# Patient Record
Sex: MALE | Race: OTHER | NOT HISPANIC OR LATINO
[De-identification: names, ages, dates, MRNs, and addresses within clinical notes are randomized per-mention and may not be internally consistent; named-entity substitution may affect disease eponyms.]

---

## 2019-07-16 PROBLEM — Z00.00 ENCOUNTER FOR PREVENTIVE HEALTH EXAMINATION: Status: ACTIVE | Noted: 2019-07-16

## 2019-07-17 ENCOUNTER — APPOINTMENT (OUTPATIENT)
Dept: CARDIOLOGY | Facility: CLINIC | Age: 84
End: 2019-07-17
Payer: MEDICARE

## 2019-07-17 ENCOUNTER — NON-APPOINTMENT (OUTPATIENT)
Age: 84
End: 2019-07-17

## 2019-07-17 ENCOUNTER — EMERGENCY (EMERGENCY)
Facility: HOSPITAL | Age: 84
LOS: 1 days | End: 2019-07-17
Admitting: EMERGENCY MEDICINE
Payer: MEDICARE

## 2019-07-17 VITALS
WEIGHT: 201 LBS | BODY MASS INDEX: 31.55 KG/M2 | HEART RATE: 69 BPM | DIASTOLIC BLOOD PRESSURE: 70 MMHG | HEIGHT: 67 IN | SYSTOLIC BLOOD PRESSURE: 110 MMHG | OXYGEN SATURATION: 95 %

## 2019-07-17 DIAGNOSIS — Z82.5 FAMILY HISTORY OF ASTHMA AND OTHER CHRONIC LOWER RESPIRATORY DISEASES: ICD-10-CM

## 2019-07-17 DIAGNOSIS — Z78.9 OTHER SPECIFIED HEALTH STATUS: ICD-10-CM

## 2019-07-17 DIAGNOSIS — Z82.49 FAMILY HISTORY OF ISCHEMIC HEART DISEASE AND OTHER DISEASES OF THE CIRCULATORY SYSTEM: ICD-10-CM

## 2019-07-17 PROCEDURE — 74177 CT ABD & PELVIS W/CONTRAST: CPT | Mod: 26

## 2019-07-17 PROCEDURE — 99203 OFFICE O/P NEW LOW 30 MIN: CPT

## 2019-07-17 PROCEDURE — 71275 CT ANGIOGRAPHY CHEST: CPT | Mod: 26

## 2019-07-17 PROCEDURE — 99285 EMERGENCY DEPT VISIT HI MDM: CPT

## 2019-07-17 PROCEDURE — 71045 X-RAY EXAM CHEST 1 VIEW: CPT | Mod: 26

## 2019-07-17 NOTE — PHYSICAL EXAM
[General Appearance - Well Developed] : well developed [Normal Appearance] : normal appearance [Well Groomed] : well groomed [General Appearance - Well Nourished] : well nourished [No Deformities] : no deformities [General Appearance - In No Acute Distress] : no acute distress [Normal Conjunctiva] : the conjunctiva exhibited no abnormalities [Eyelids - No Xanthelasma] : the eyelids demonstrated no xanthelasmas [Normal Oral Mucosa] : normal oral mucosa [No Oral Pallor] : no oral pallor [No Oral Cyanosis] : no oral cyanosis [Normal Jugular Venous A Waves Present] : normal jugular venous A waves present [Normal Jugular Venous V Waves Present] : normal jugular venous V waves present [No Jugular Venous Garcia A Waves] : no jugular venous garcia A waves [Heart Rate And Rhythm] : heart rate and rhythm were normal [Murmurs] : no murmurs present [FreeTextEntry1] : 2/6 MARYLIN LSB [Respiration, Rhythm And Depth] : normal respiratory rhythm and effort [Exaggerated Use Of Accessory Muscles For Inspiration] : no accessory muscle use [Auscultation Breath Sounds / Voice Sounds] : lungs were clear to auscultation bilaterally [Abdomen Soft] : soft [Abdomen Tenderness] : non-tender [Abdomen Mass (___ Cm)] : no abdominal mass palpated [Abnormal Walk] : normal gait [Gait - Sufficient For Exercise Testing] : the gait was sufficient for exercise testing [Nail Clubbing] : no clubbing of the fingernails [Cyanosis, Localized] : no localized cyanosis [Petechial Hemorrhages (___cm)] : no petechial hemorrhages [Skin Color & Pigmentation] : normal skin color and pigmentation [] : no rash [No Venous Stasis] : no venous stasis [Skin Lesions] : no skin lesions [No Skin Ulcers] : no skin ulcer [No Xanthoma] : no  xanthoma was observed [Oriented To Time, Place, And Person] : oriented to person, place, and time [Affect] : the affect was normal [Mood] : the mood was normal [No Anxiety] : not feeling anxious

## 2019-07-17 NOTE — REASON FOR VISIT
[Consultation] : a consultation regarding [FreeTextEntry2] : new symptomatic BENJY conversion to sinus rhythm with rate control, recurrent chest and abdominal pain [FreeTextEntry1] : Jeanmarie is a 84-year-old male with history of hypertension, asthma.\par \par Patient had office evaluation PCP 7/16/19 recurrent chest pain abdominal pain found to be in new-onset rapid atrial fibrillation.  Patient started on Bystolic and Eliquis.  EKG 7/16/19 rapid AF 136bpm, RBBB, NSST.  EKG 7/17/19 sinus rhythm 66 bpm RBBB, LAFB, bifiaicular block\par \par Patient reports over the past 2 weeks recurrent episodes of chest pain "pressure" moderate intensity with radiation to the back associated with emotional stress.  Last chest pain occurred 2 days ago.  Patient reports recurrent abdominal pain with radiation to the back when laying down.  No nausea, vomiting, diarrhea, bloody stools. Cardiovascular review of symptoms is negative for exertional  dyspnea, palpitations, dizziness or syncope.  No PND or orthopnea leg edema.  No bleeding or black stool.\par \par No history of CAD, MI, CHF, TIA, CVA, diabetes, PVD, DVT, PE, arrhythmia, Afib\par \par Patient is working as a  lifting heavy cuts of meat.  Patient walked for 10 minutes with exertional chest pain.  Discussed limiting lifting to less than 20lbs.

## 2019-07-17 NOTE — DISCUSSION/SUMMARY
[FreeTextEntry1] : Jeanmarie is 84-year-old male with medical history detailed above and active medical issues including:\par \par - Recurrent chest pain, abnormal baseline EKG, multiple CAD risk factors. Risks and options of cardiac catheterization have been discussed, including the risk of bleeding stroke heart attack.  Patient wishes to proceed and will be scheduled for catheterization within one week.  Persistent chest pain patient will call 911 and go to the emergency room. Patient will discontinue Eliquis pending cardiac catheterization. \par \par - Abdominal pain with radiation to the back.  Patient will have contrast CT abdomen pelvis without abdominal aortic aneurysm.  Patient referred to ER [facility] for labs and CAT scan. \par \par - New-onset symptomatic BENJY, conversion to sinus rhythm with rate control on Bystolic and Eliquis\par \par - Hypertension at BP goal less than 130/80\par \par - History of asthma\par \par Patient educated on lifestyle and diet modification with low sodium low fat diet and avoidance of excessive alcohol. Patient is aware to call with any symptoms or concerns.  Recommended increased oral hydration with electrolyte suppliment drinks, avoid caffeine and alcohol intake.\par \par Patient will be seen in cardiology followup after cardiac catheterization.\par \par Followup with Dr Gurpreet Paulino for primary care. \par

## 2019-07-18 PROCEDURE — 93010 ELECTROCARDIOGRAM REPORT: CPT

## 2019-07-22 PROCEDURE — 93224 XTRNL ECG REC UP TO 48 HRS: CPT

## 2019-07-24 ENCOUNTER — OUTPATIENT (OUTPATIENT)
Dept: OUTPATIENT SERVICES | Facility: HOSPITAL | Age: 84
LOS: 1 days | End: 2019-07-24
Payer: MEDICARE

## 2019-07-24 PROCEDURE — 93010 ELECTROCARDIOGRAM REPORT: CPT

## 2019-07-24 PROCEDURE — 93458 L HRT ARTERY/VENTRICLE ANGIO: CPT | Mod: 26

## 2019-07-25 ENCOUNTER — APPOINTMENT (OUTPATIENT)
Dept: CARDIOLOGY | Facility: CLINIC | Age: 84
End: 2019-07-25
Payer: MEDICARE

## 2019-07-25 ENCOUNTER — NON-APPOINTMENT (OUTPATIENT)
Age: 84
End: 2019-07-25

## 2019-07-25 VITALS
SYSTOLIC BLOOD PRESSURE: 126 MMHG | HEIGHT: 67 IN | OXYGEN SATURATION: 93 % | HEART RATE: 144 BPM | DIASTOLIC BLOOD PRESSURE: 90 MMHG | BODY MASS INDEX: 31.39 KG/M2 | WEIGHT: 200 LBS

## 2019-07-25 PROCEDURE — 99215 OFFICE O/P EST HI 40 MIN: CPT

## 2019-07-25 PROCEDURE — 93306 TTE W/DOPPLER COMPLETE: CPT

## 2019-07-25 PROCEDURE — 93000 ELECTROCARDIOGRAM COMPLETE: CPT

## 2019-07-25 RX ORDER — NEBIVOLOL HYDROCHLORIDE 5 MG/1
5 TABLET ORAL DAILY
Refills: 0 | Status: DISCONTINUED | COMMUNITY
End: 2019-07-25

## 2019-07-25 NOTE — PHYSICAL EXAM
[General Appearance - Well Developed] : well developed [Normal Appearance] : normal appearance [Well Groomed] : well groomed [General Appearance - Well Nourished] : well nourished [No Deformities] : no deformities [General Appearance - In No Acute Distress] : no acute distress [Normal Conjunctiva] : the conjunctiva exhibited no abnormalities [Eyelids - No Xanthelasma] : the eyelids demonstrated no xanthelasmas [Normal Oral Mucosa] : normal oral mucosa [No Oral Pallor] : no oral pallor [No Oral Cyanosis] : no oral cyanosis [Normal Jugular Venous A Waves Present] : normal jugular venous A waves present [Normal Jugular Venous V Waves Present] : normal jugular venous V waves present [No Jugular Venous Garcia A Waves] : no jugular venous garcia A waves [Respiration, Rhythm And Depth] : normal respiratory rhythm and effort [Exaggerated Use Of Accessory Muscles For Inspiration] : no accessory muscle use [Auscultation Breath Sounds / Voice Sounds] : lungs were clear to auscultation bilaterally [Heart Rate And Rhythm] : heart rate and rhythm were normal [Murmurs] : no murmurs present [Abdomen Soft] : soft [Abdomen Tenderness] : non-tender [Abdomen Mass (___ Cm)] : no abdominal mass palpated [Abnormal Walk] : normal gait [Gait - Sufficient For Exercise Testing] : the gait was sufficient for exercise testing [Nail Clubbing] : no clubbing of the fingernails [Cyanosis, Localized] : no localized cyanosis [Petechial Hemorrhages (___cm)] : no petechial hemorrhages [Skin Color & Pigmentation] : normal skin color and pigmentation [] : no rash [No Venous Stasis] : no venous stasis [Skin Lesions] : no skin lesions [No Skin Ulcers] : no skin ulcer [No Xanthoma] : no  xanthoma was observed [Oriented To Time, Place, And Person] : oriented to person, place, and time [Affect] : the affect was normal [Mood] : the mood was normal [No Anxiety] : not feeling anxious [FreeTextEntry1] : 2/6 MARYLIN LSB

## 2019-07-25 NOTE — DISCUSSION/SUMMARY
[FreeTextEntry1] : Jeanmarie is 84-year-old male with medical history detailed above and active medical issues including:\par \par - Patient had rapid AF in office today given intravenous Lopressor 5 mg x1 and Lopressor 25 mg orally with improvement in AF rate below 100 bpm, stable blood pressure 125/70\par \par - Echocardiogram 7/25/19, LVEF 60%, severe MR eccentric jet with flail posterior mitral valve leaflet.  Case discussed with cardiothoracic surgeon Dr Fito Elliott.  Patient will have office consult tomorrow. \par \par - Patient had cardiac catheterization 7/24/19 for recurrent chest pain, nonobstructive coronaries, normal LVEF, moderate severe MR.  Prior to cardiac catheterization symptomatic rapid atrial fibrillation started on Eliquis and Toprol.\par \par - Hypertension at BP goal less than 130/80\par \par - History of asthma\par \par Patient educated on lifestyle and diet modification with low sodium low fat diet and avoidance of excessive alcohol. Patient is aware to call with any symptoms or concerns.  Recommended increased oral hydration with electrolyte suppliment drinks, avoid caffeine and alcohol intake.\par \par Patient will be seen in cardiology followup after mitral valve surgery. \par \par Followup with Dr Gurpreet Paulino for primary care. \par \par Additional office time today to monitor rhythm, HR and BP, insert intravenous, administer IV lopressor, oral lopressor, call cardiothorasic surgery to arrange office evaluation with CT surgeon Dr Fito Elliott.  \par

## 2019-07-25 NOTE — REASON FOR VISIT
[Consultation] : a consultation regarding [FreeTextEntry2] : nonobstructive cath 7/24/19, severe eccentric MR, flair posterior MVL tip, normal LVEF, symptomatic BENJY x2 started on Eliquis and Toprol   [FreeTextEntry1] : Jeanmarie is a 84-year-old male with history of hypertension, asthma. Patient seen in the office today with his daughter in law who is visiting from [place], [place]. \par \par Patient had rapid AF in office today given intravenous Lopressor 5 mg x1 and Lopressor 25 mg orally with improvement in AF rate below 100 bpm, stable blood pressure 125/70\par \par Echocardiogram 7/25/19, LVEF 60%, severe MR eccentric jet with flail posterior mitral valve leaflet.  Case discussed with cardiothoracic surgeon Dr Fito Elliott.  Patient will have office consult tomorrow. \par \par Patient had cardiac catheterization 7/24/19 for recurrent chest pain, nonobstructive coronaries, normal LVEF, moderate severe MR.  Prior to cardiac catheterization symptomatic rapid atrial fibrillation started on Eliquis and Toprol.\par \par Patient had office evaluation PCP 7/16/19 recurrent chest pain abdominal pain found to be in new-onset rapid atrial fibrillation.  Patient started on Bystolic and Eliquis.  EKG 7/16/19 rapid AF 136bpm, RBBB, NSST.  EKG 7/17/19 sinus rhythm 66 bpm RBBB, LAFB, bifiaicular block\par \par \par Patient had recurrent atypical chest pain and abdominal pain symptoms.  Contrast CT abdomen negative for aneurysm. Cardiovascular review of symptoms is negative for exertional chest pain, dyspnea, palpitations, dizziness or syncope.  No PND or orthopnea leg edema.  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  Discussed limiting lifting to less than 20lbs.

## 2019-07-26 ENCOUNTER — APPOINTMENT (OUTPATIENT)
Dept: CARDIOTHORACIC SURGERY | Facility: CLINIC | Age: 84
End: 2019-07-26
Payer: MEDICARE

## 2019-07-26 VITALS
DIASTOLIC BLOOD PRESSURE: 90 MMHG | RESPIRATION RATE: 16 BRPM | HEART RATE: 133 BPM | HEIGHT: 67 IN | SYSTOLIC BLOOD PRESSURE: 129 MMHG | WEIGHT: 200 LBS | BODY MASS INDEX: 31.39 KG/M2 | OXYGEN SATURATION: 98 %

## 2019-07-26 PROCEDURE — 99205 OFFICE O/P NEW HI 60 MIN: CPT

## 2019-07-30 ENCOUNTER — INPATIENT (INPATIENT)
Facility: HOSPITAL | Age: 84
LOS: 1 days | Discharge: HOME | End: 2019-08-01
Attending: INTERNAL MEDICINE | Admitting: FAMILY MEDICINE
Payer: MEDICARE

## 2019-07-30 ENCOUNTER — OUTPATIENT (OUTPATIENT)
Dept: OUTPATIENT SERVICES | Facility: HOSPITAL | Age: 84
LOS: 1 days | End: 2019-07-30

## 2019-07-30 PROCEDURE — 99223 1ST HOSP IP/OBS HIGH 75: CPT

## 2019-07-30 PROCEDURE — 99285 EMERGENCY DEPT VISIT HI MDM: CPT | Mod: GC

## 2019-07-30 PROCEDURE — 71045 X-RAY EXAM CHEST 1 VIEW: CPT | Mod: 26

## 2019-07-30 PROCEDURE — 93224 XTRNL ECG REC UP TO 48 HRS: CPT

## 2019-07-31 ENCOUNTER — OUTPATIENT (OUTPATIENT)
Dept: OUTPATIENT SERVICES | Facility: HOSPITAL | Age: 84
LOS: 1 days | End: 2019-07-31

## 2019-07-31 PROCEDURE — 93312 ECHO TRANSESOPHAGEAL: CPT | Mod: 26

## 2019-07-31 PROCEDURE — 92960 CARDIOVERSION ELECTRIC EXT: CPT

## 2019-07-31 PROCEDURE — 93010 ELECTROCARDIOGRAM REPORT: CPT | Mod: 76

## 2019-07-31 PROCEDURE — 99233 SBSQ HOSP IP/OBS HIGH 50: CPT | Mod: 25

## 2019-07-31 PROCEDURE — 93320 DOPPLER ECHO COMPLETE: CPT | Mod: 26

## 2019-08-01 ENCOUNTER — OUTPATIENT (OUTPATIENT)
Dept: OUTPATIENT SERVICES | Facility: HOSPITAL | Age: 84
LOS: 1 days | End: 2019-08-01

## 2019-08-01 ENCOUNTER — APPOINTMENT (OUTPATIENT)
Dept: CARDIOLOGY | Facility: CLINIC | Age: 84
End: 2019-08-01

## 2019-08-01 PROCEDURE — 99233 SBSQ HOSP IP/OBS HIGH 50: CPT

## 2019-08-06 ENCOUNTER — APPOINTMENT (OUTPATIENT)
Dept: CARDIOTHORACIC SURGERY | Facility: CLINIC | Age: 84
End: 2019-08-06
Payer: MEDICARE

## 2019-08-06 VITALS
BODY MASS INDEX: 31.39 KG/M2 | RESPIRATION RATE: 16 BRPM | HEART RATE: 108 BPM | HEIGHT: 67 IN | WEIGHT: 200 LBS | DIASTOLIC BLOOD PRESSURE: 86 MMHG | SYSTOLIC BLOOD PRESSURE: 128 MMHG | OXYGEN SATURATION: 97 %

## 2019-08-06 PROCEDURE — 99213 OFFICE O/P EST LOW 20 MIN: CPT

## 2019-08-06 RX ORDER — VIT A/VIT C/VIT E/ZINC/COPPER 4296-226
CAPSULE ORAL
Refills: 0 | Status: COMPLETED | COMMUNITY
End: 2019-08-06

## 2019-08-06 NOTE — PHYSICAL EXAM
[Sclera] : the sclera and conjunctiva were normal [Neck Appearance] : the appearance of the neck was normal [] : no respiratory distress [Exaggerated Use Of Accessory Muscles For Inspiration] : no accessory muscle use [Apical Impulse] : the apical impulse was normal [Heart Sounds] : normal S1 and S2 [Examination Of The Chest] : the chest was normal in appearance [Arterial Pulses Carotid] : carotid pulses were normal with no bruits [Arterial Pulses Femoral] : femoral pulses were normal without bruits [Edema] : there was no peripheral edema [Bowel Sounds] : normal bowel sounds [Abdomen Soft] : soft [Cervical Lymph Nodes Enlarged Posterior Bilaterally] : posterior cervical [Supraclavicular Lymph Nodes Enlarged Bilaterally] : supraclavicular [No CVA Tenderness] : no ~M costovertebral angle tenderness [FreeTextEntry1] : unsteady gait. frail hernesto [Skin Color & Pigmentation] : normal skin color and pigmentation [Skin Turgor] : normal skin turgor [Cranial Nerves] : cranial nerves 2-12 were intact [Impaired Insight] : insight and judgment were intact [Oriented To Time, Place, And Person] : oriented to person, place, and time

## 2019-08-06 NOTE — HISTORY OF PRESENT ILLNESS
[FreeTextEntry1] : This is a 84 year old with a history of recurrent chest pain and abdominal pain found to have new-on set rapid atrial fibrillation patient was started on bystolic and eliquis. Bystolic now changed to metoprolol to control his heart rate. Cardiac cath from 7/24/19 showed moderate to severe mitral valve regurgitation. Echocardiogram on 7/25/19, LVEF 60%, severe MR eccentric jet with flail posterior mitral valve leaflet.\par \par Mr Joiner had been hospitalized at [facility] on 7/31/19 for reports of "very high heart rate of 135-172" and a cardiac ablation with PA was performed by Dr Pineda.\par \par PA at that time demonstrated severe prolapse of posterior mitral leaflet with slight flail of P2 segment, severe torrential mitral regurgitation and moderate tricuspid regurg.

## 2019-08-06 NOTE — ASSESSMENT
[FreeTextEntry1] : Mr. Joiner is an 84 year old male with frailty and severe mitral valve regurgitation. Given his age and frailty he is at higher irks for open surgery and does not want to proceed with open surgery. He is a candidate for mitral clipping procedure.  I explained the risks, benefits, and alternatives of surgery to the patient and family. They understand and agree to proceed. I thank you for the opportunity to participate in the care of your patients. Please do not hesitate to contact me should you have any questions.\par

## 2019-08-06 NOTE — REVIEW OF SYSTEMS
[Feeling Tired] : feeling tired [Palpitations] : palpitations [Lower Ext Edema] : lower extremity edema [Shortness Of Breath] : shortness of breath [SOB on Exertion] : shortness of breath during exertion [Negative] : Heme/Lymph

## 2019-08-06 NOTE — CONSULT LETTER
[Dear  ___] : Dear  [unfilled], [Consult Closing:] : Thank you very much for allowing me to participate in the care of this patient.  If you have any questions, please do not hesitate to contact me. [Consult Letter:] : I had the pleasure of evaluating your patient, [unfilled]. [Sincerely,] : Sincerely, [FreeTextEntry2] : Patrick Valentino, MD [FreeTextEntry3] : Fito Burgess MD\par  of Cardiothoracic Surgery\par [facility]\par [address] [place] [address] \[place] [address]\par [phone]\par

## 2019-08-08 ENCOUNTER — NON-APPOINTMENT (OUTPATIENT)
Age: 84
End: 2019-08-08

## 2019-08-08 ENCOUNTER — APPOINTMENT (OUTPATIENT)
Dept: CARDIOLOGY | Facility: CLINIC | Age: 84
End: 2019-08-08
Payer: MEDICARE

## 2019-08-08 VITALS
HEART RATE: 107 BPM | DIASTOLIC BLOOD PRESSURE: 60 MMHG | OXYGEN SATURATION: 97 % | BODY MASS INDEX: 31.55 KG/M2 | HEIGHT: 67 IN | SYSTOLIC BLOOD PRESSURE: 112 MMHG | WEIGHT: 201 LBS

## 2019-08-08 PROCEDURE — 99214 OFFICE O/P EST MOD 30 MIN: CPT

## 2019-08-08 PROCEDURE — 93000 ELECTROCARDIOGRAM COMPLETE: CPT

## 2019-08-08 RX ORDER — METOPROLOL SUCCINATE 50 MG/1
50 TABLET, EXTENDED RELEASE ORAL DAILY
Qty: 90 | Refills: 1 | Status: DISCONTINUED | COMMUNITY
Start: 1900-01-01 | End: 2019-08-08

## 2019-08-08 NOTE — PHYSICAL EXAM
[General Appearance - Well Developed] : well developed [Normal Appearance] : normal appearance [Well Groomed] : well groomed [General Appearance - Well Nourished] : well nourished [General Appearance - In No Acute Distress] : no acute distress [No Deformities] : no deformities [Normal Conjunctiva] : the conjunctiva exhibited no abnormalities [Normal Oral Mucosa] : normal oral mucosa [Eyelids - No Xanthelasma] : the eyelids demonstrated no xanthelasmas [No Oral Pallor] : no oral pallor [No Oral Cyanosis] : no oral cyanosis [Normal Jugular Venous V Waves Present] : normal jugular venous V waves present [Normal Jugular Venous A Waves Present] : normal jugular venous A waves present [No Jugular Venous Garcia A Waves] : no jugular venous garcia A waves [Respiration, Rhythm And Depth] : normal respiratory rhythm and effort [Exaggerated Use Of Accessory Muscles For Inspiration] : no accessory muscle use [Auscultation Breath Sounds / Voice Sounds] : lungs were clear to auscultation bilaterally [Murmurs] : no murmurs present [Heart Rate And Rhythm] : heart rate and rhythm were normal [Abdomen Soft] : soft [Abdomen Tenderness] : non-tender [Abnormal Walk] : normal gait [Abdomen Mass (___ Cm)] : no abdominal mass palpated [Gait - Sufficient For Exercise Testing] : the gait was sufficient for exercise testing [Nail Clubbing] : no clubbing of the fingernails [Cyanosis, Localized] : no localized cyanosis [Petechial Hemorrhages (___cm)] : no petechial hemorrhages [] : no rash [Skin Color & Pigmentation] : normal skin color and pigmentation [No Venous Stasis] : no venous stasis [No Skin Ulcers] : no skin ulcer [Skin Lesions] : no skin lesions [No Xanthoma] : no  xanthoma was observed [Affect] : the affect was normal [Oriented To Time, Place, And Person] : oriented to person, place, and time [Mood] : the mood was normal [No Anxiety] : not feeling anxious [FreeTextEntry1] : irreg 3/6 MARYLIN MR

## 2019-08-08 NOTE — DISCUSSION/SUMMARY
[FreeTextEntry1] : Jeanmarie is 84-year-old male with medical history detailed above and active medical issues including:\par \par - Moderate accelerated rate AF, patient will take extra dose amiodarone 200 today and continue  Amiodarone 200mg daily dose. Patient states home heart rates in the 80s with stable blood pressure.  \par \par - Echocardiogram 7/25/19, LVEF 60%, severe MR eccentric jet with flail posterior mitral valve leaflet.  Patient evaluated by CT surgeon Dr Elliott with plan for mitral valve clip September 2019.  Patient is high-risk for open valve repair surgery with advanced age and frail condition.  \par \par - Patient had cardiac catheterization 7/24/19 for recurrent chest pain, nonobstructive coronaries, normal LVEF, moderate severe MR.  Prior to cardiac catheterization symptomatic rapid atrial fibrillation started on Eliquis and Toprol.\par \par - Hypertension at BP goal less than 130/80\par \par - History of asthma\par \par Patient educated on lifestyle and diet modification with low sodium low fat diet and avoidance of excessive alcohol. Patient is aware to call with any symptoms or concerns.  Recommended increased oral hydration with electrolyte suppliment drinks, avoid caffeine and alcohol intake.\par \par Patient will be seen in cardiology followup 2 weeks.  Patient will monitor home heart rate and blood pressures daily and call with average HR/BP.  \par \par Followup with Dr Gurpreet Paulino for primary care. \par

## 2019-08-08 NOTE — REASON FOR VISIT
[Consultation] : a consultation regarding [FreeTextEntry2] : nonobstructive cath 7/24/19, severe eccentric MR, flair posterior MVL tip, normal LVEF, symptomatic BENJY x2 started on Eliquis and Toprol   [FreeTextEntry1] : Jeanmarie is a 84-year-old male with history of hypertension, asthma. 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  Patient is high-risk for open valve repair surgery with advanced age and frail condition. \par \par Patient has dyspnea with moderate exertion.  Patient has mild dependent leg edema. Cardiovascular review of symptoms is negative for exertional chest pain, palpitations, dizziness or syncope.  No PND or orthopnea.  No bleeding or black stool.\par \par Patient had rapid AF in office 7/26/19 given intravenous Lopressor 5 mg x1 and Lopressor 25 mg orally with improvement in AF rate below 100 bpm, stable blood pressure 125/70\par \par Echocardiogram 7/25/19, LVEF 60%, severe MR eccentric jet with flail posterior mitral valve leaflet.  Case discussed with cardiothoracic surgeon Dr Fito Elliott.  Patient will have office consult tomorrow. \par \par Patient had cardiac catheterization 7/24/19 for recurrent chest pain, nonobstructive coronaries, normal LVEF, moderate severe MR.  Prior to cardiac catheterization symptomatic rapid atrial fibrillation started on Eliquis and Toprol.\par \par Patient had office evaluation PCP 7/16/19 recurrent chest pain abdominal pain found to be in new-onset rapid atrial fibrillation.  Patient started on Bystolic and Eliquis.  EKG 7/16/19 rapid AF 136bpm, RBBB, NSST.  EKG 7/17/19 sinus rhythm 66 bpm RBBB, LAFB, bifiaicular block\par \par Patient had recurrent atypical chest pain and abdominal pain symptoms.  Contrast CT abdomen negative for aneurysm. Cardiovascular review of symptoms is negative for exertional chest pain, dyspnea, palpitations, dizziness or syncope.  No PND or orthopnea leg edema.  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  Discussed limiting lifting to less than 20lbs.

## 2019-08-09 NOTE — HISTORY OF PRESENT ILLNESS
[FreeTextEntry1] : This is a 84 year old with a history of recurrent chest pain and abdominal pain found to have new-on set rapid atrial fibrillation patient was started on bystolic and eliquis. Bystolic now changed to metoprolol to control his heart rate. Cardiac cath from 7/24/19 showed moderate to severe mitral valve regurgitation. Echocardiogram on 7/25/19, LVEF 60%, severe MR eccentric jet with flail posterior mitral valve leaflet .Presents today without complaints of chest pain or palpitations.

## 2019-08-09 NOTE — REVIEW OF SYSTEMS
[Heart Rate Is Fast] : fast heart rate [Chest Pain] : chest pain [Palpitations] : palpitations [SOB on Exertion] : shortness of breath during exertion [Easy Bruising] : a tendency for easy bruising [Negative] : Endocrine [Lower Ext Edema] : no extremity edema [Anxiety] : no anxiety [Depression] : no depression

## 2019-08-09 NOTE — PHYSICAL EXAM
[General Appearance - Alert] : alert [General Appearance - Well Nourished] : well nourished [General Appearance - Well Developed] : well developed [Sclera] : the sclera and conjunctiva were normal [Outer Ear] : the ears and nose were normal in appearance [Neck Appearance] : the appearance of the neck was normal [Exaggerated Use Of Accessory Muscles For Inspiration] : no accessory muscle use [Apical Impulse] : the apical impulse was normal [Heart Sounds] : normal S1 and S2 [FreeTextEntry1] : III/VI systolic murmru at th eapex [Examination Of The Chest] : the chest was normal in appearance [Arterial Pulses Carotid] : carotid pulses were normal with no bruits [Abdominal Aorta] : the abdominal aorta was normal [Arterial Pulses Femoral] : femoral pulses were normal without bruits [Bowel Sounds] : normal bowel sounds [Abdomen Soft] : soft [No CVA Tenderness] : no ~M costovertebral angle tenderness [Cervical Lymph Nodes Enlarged Posterior Bilaterally] : posterior cervical [Abnormal Walk] : normal gait [Nail Clubbing] : no clubbing  or cyanosis of the fingernails [Skin Color & Pigmentation] : normal skin color and pigmentation [] : no rash [Cranial Nerves] : cranial nerves 2-12 were intact [Sensation] : the sensory exam was normal to light touch and pinprick [Oriented To Time, Place, And Person] : oriented to person, place, and time [Affect] : the affect was normal

## 2019-08-09 NOTE — CONSULT LETTER
[Dear  ___] : Dear  [unfilled], [Consult Letter:] : I had the pleasure of evaluating your patient, [unfilled]. [Please see my note below.] : Please see my note below. [Consult Closing:] : Thank you very much for allowing me to participate in the care of this patient.  If you have any questions, please do not hesitate to contact me. [Sincerely,] : Sincerely, [FreeTextEntry2] : Dr. Patrick Valentino [FreeTextEntry3] : Fito Burgess MD\par \par Cardiovascular and Thoracic Surgery\par Associate  Professor\par [facility] [facility] [employer]\par [facility]\par [address] [place] [address]\par [place] [address]\par \par \par

## 2019-08-09 NOTE — ASSESSMENT
[FreeTextEntry1] : Mr. Joiner is an 84 year old with severe symptomatic mitral regurgitation who needs a PA to assess his valve and consider him for possible clip procedure. \par \par  I explained the risks, benefits, and alternatives of surgery to the patient and family. They understand and agree to proceed with testing.

## 2019-08-15 ENCOUNTER — APPOINTMENT (OUTPATIENT)
Dept: CARDIOLOGY | Facility: CLINIC | Age: 84
End: 2019-08-15
Payer: MEDICARE

## 2019-08-15 VITALS
HEART RATE: 62 BPM | BODY MASS INDEX: 32.02 KG/M2 | DIASTOLIC BLOOD PRESSURE: 64 MMHG | OXYGEN SATURATION: 95 % | WEIGHT: 204 LBS | HEIGHT: 67 IN | SYSTOLIC BLOOD PRESSURE: 100 MMHG

## 2019-08-15 PROCEDURE — 99214 OFFICE O/P EST MOD 30 MIN: CPT

## 2019-08-15 NOTE — PHYSICAL EXAM
[General Appearance - Well Developed] : well developed [Normal Appearance] : normal appearance [Well Groomed] : well groomed [General Appearance - Well Nourished] : well nourished [No Deformities] : no deformities [General Appearance - In No Acute Distress] : no acute distress [Normal Conjunctiva] : the conjunctiva exhibited no abnormalities [Eyelids - No Xanthelasma] : the eyelids demonstrated no xanthelasmas [Normal Oral Mucosa] : normal oral mucosa [No Oral Pallor] : no oral pallor [No Oral Cyanosis] : no oral cyanosis [Normal Jugular Venous A Waves Present] : normal jugular venous A waves present [Normal Jugular Venous V Waves Present] : normal jugular venous V waves present [No Jugular Venous Garcia A Waves] : no jugular venous garcia A waves [Respiration, Rhythm And Depth] : normal respiratory rhythm and effort [Exaggerated Use Of Accessory Muscles For Inspiration] : no accessory muscle use [Auscultation Breath Sounds / Voice Sounds] : lungs were clear to auscultation bilaterally [Heart Rate And Rhythm] : heart rate and rhythm were normal [Murmurs] : no murmurs present [Abdomen Soft] : soft [Abdomen Tenderness] : non-tender [Abdomen Mass (___ Cm)] : no abdominal mass palpated [Abnormal Walk] : normal gait [Gait - Sufficient For Exercise Testing] : the gait was sufficient for exercise testing [Nail Clubbing] : no clubbing of the fingernails [Cyanosis, Localized] : no localized cyanosis [Petechial Hemorrhages (___cm)] : no petechial hemorrhages [Skin Color & Pigmentation] : normal skin color and pigmentation [] : no rash [No Venous Stasis] : no venous stasis [Skin Lesions] : no skin lesions [No Skin Ulcers] : no skin ulcer [No Xanthoma] : no  xanthoma was observed [Oriented To Time, Place, And Person] : oriented to person, place, and time [Affect] : the affect was normal [Mood] : the mood was normal [No Anxiety] : not feeling anxious [FreeTextEntry1] : irreg 3/6 MARYLIN MR

## 2019-08-15 NOTE — REASON FOR VISIT
[Consultation] : a consultation regarding [FreeTextEntry2] : progressive leg and scrotal edema, 4 pound weight gain over one week, HFpEF, severe MR awaiting mitral valve clip surgery Sept 2019 [FreeTextEntry1] : Jeanmarie is a 84-year-old male with history of hypertension, asthma, severe MR, normal LVEF, recurrent rapid AF on Eliquis, amiodarone, Toprol. . Patient seen in the office today with his daughter in law who is visiting from [place], [place].\par \par Patient has progressive leg and scrotal edema, 4 pound weight gain over the past week.  Lasix 40mg daily for 3 days then 20 mg daily maintenance started today, with close monitoring of BP.  Patient seen today with his daughter-in-law participates in his healthcare. \par \par Patient evaluated by vascular surgery Dr Elliott with plan for mitral valve clip surgery September 2019.  Discussed case with Dr Elliott if possible surgery date will be done sooner.\par \par \par PA Dr Catherine 7/31/19 [facility], mitral valve leaflet redundancy with severe prolapse slight flail leaflet P2 signal with severe torrential MR, elective DC cardioversion performed patient started on amiodarone 200mg daily \par \par Patient evaluated by CT surgeon Dr Elliott with plan for mitral valve clip September 2019.  Patient is high-risk for open valve repair surgery with advanced age and frail condition. \par \par Patient has dyspnea with moderate exertion.  Patient has mild dependent leg edema. Cardiovascular review of symptoms is negative for exertional chest pain, palpitations, dizziness or syncope.  No PND or orthopnea.  No bleeding or black stool.\par \par Patient had rapid AF in office 7/26/19 given intravenous Lopressor 5 mg x1 and Lopressor 25 mg orally with improvement in AF rate below 100 bpm, stable blood pressure 125/70\par \par Echocardiogram 7/25/19, LVEF 60%, severe MR eccentric jet with flail posterior mitral valve leaflet.  Case discussed with cardiothoracic surgeon Dr Fito Elliott.  Patient will have office consult tomorrow. \par \par Patient had cardiac catheterization 7/24/19 for recurrent chest pain, nonobstructive coronaries, normal LVEF, moderate severe MR.  Prior to cardiac catheterization symptomatic rapid atrial fibrillation started on Eliquis and Toprol.\par \par Patient had office evaluation PCP 7/16/19 recurrent chest pain abdominal pain found to be in new-onset rapid atrial fibrillation.  Patient started on Bystolic and Eliquis.  EKG 7/16/19 rapid AF 136bpm, RBBB, NSST.  EKG 7/17/19 sinus rhythm 66 bpm RBBB, LAFB, bifiaicular block\par \par Patient had recurrent atypical chest pain and abdominal pain symptoms.  Contrast CT abdomen negative for aneurysm. Cardiovascular review of symptoms is negative for exertional chest pain, dyspnea, palpitations, dizziness or syncope.  No PND or orthopnea leg edema.  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  Discussed limiting lifting to less than 20lbs.

## 2019-08-15 NOTE — REVIEW OF SYSTEMS
[Chest  Pressure] : chest pressure [Abdominal Pain] : abdominal pain [Chest Pain] : no chest pain [Dyspnea on exertion] : dyspnea during exertion [Lower Ext Edema] : lower extremity edema [Negative] : Heme/Lymph [FreeTextEntry1] : progressive scrotal edema

## 2019-08-15 NOTE — DISCUSSION/SUMMARY
[FreeTextEntry1] : Jeanmarie is 84-year-old male with medical history detailed above and active medical issues including:\par \par - Patient has progressive leg and scrotal edema, 4 pound weight gain over the past week.  Lasix 40mg daily for 3 days then 20 mg daily maintenance started today, with close monitoring of BP.  Patient seen today with his daughter-in-law participates in his healthcare.\par \par - Multiple evaluations with BENJY, currently rate controlled on Toprol and amiodarone, continue anticoagulation with Eliquis\par \par - Echocardiogram 7/25/19, LVEF 60%, severe MR eccentric jet with flail posterior mitral valve leaflet.  Patient evaluated by CT surgeon Dr Elliott with plan for mitral valve clip September 2019.  Patient is high-risk for open valve repair surgery with advanced age and frail condition.  \par \par - Patient had cardiac catheterization 7/24/19 for recurrent chest pain, nonobstructive coronaries, normal LVEF, moderate severe MR.  Prior to cardiac catheterization symptomatic rapid atrial fibrillation started on Eliquis and Toprol.\par \par - Hypertension at BP goal less than 130/80\par \par - History of asthma\par \par Patient educated on lifestyle and diet modification with low sodium low fat diet and avoidance of excessive alcohol. Patient is aware to call with any symptoms or concerns.  Recommended increased oral hydration with electrolyte suppliment drinks, avoid caffeine and alcohol intake.\par \par Patient will be seen in cardiology followup 1 week.  Patient will monitor home heart rate and blood pressures daily and call with average HR/BP.  \par \par Followup with Dr Gurpreet Paulino for primary care. \par

## 2019-08-19 ENCOUNTER — APPOINTMENT (OUTPATIENT)
Dept: CARDIOLOGY | Facility: CLINIC | Age: 84
End: 2019-08-19
Payer: MEDICARE

## 2019-08-20 ENCOUNTER — APPOINTMENT (OUTPATIENT)
Dept: CARDIOTHORACIC SURGERY | Facility: CLINIC | Age: 84
End: 2019-08-20

## 2019-08-21 PROCEDURE — 93224 XTRNL ECG REC UP TO 48 HRS: CPT

## 2019-08-23 ENCOUNTER — APPOINTMENT (OUTPATIENT)
Dept: CARDIOLOGY | Facility: CLINIC | Age: 84
End: 2019-08-23
Payer: MEDICARE

## 2019-08-23 VITALS
SYSTOLIC BLOOD PRESSURE: 100 MMHG | BODY MASS INDEX: 32.18 KG/M2 | DIASTOLIC BLOOD PRESSURE: 62 MMHG | WEIGHT: 205 LBS | HEART RATE: 60 BPM | HEIGHT: 67 IN | OXYGEN SATURATION: 98 %

## 2019-08-23 PROCEDURE — 99214 OFFICE O/P EST MOD 30 MIN: CPT

## 2019-08-23 NOTE — PHYSICAL EXAM
[Normal Appearance] : normal appearance [General Appearance - Well Developed] : well developed [Well Groomed] : well groomed [General Appearance - Well Nourished] : well nourished [No Deformities] : no deformities [General Appearance - In No Acute Distress] : no acute distress [Normal Conjunctiva] : the conjunctiva exhibited no abnormalities [Eyelids - No Xanthelasma] : the eyelids demonstrated no xanthelasmas [Normal Oral Mucosa] : normal oral mucosa [No Oral Pallor] : no oral pallor [No Oral Cyanosis] : no oral cyanosis [Normal Jugular Venous A Waves Present] : normal jugular venous A waves present [No Jugular Venous Garcia A Waves] : no jugular venous garcia A waves [Normal Jugular Venous V Waves Present] : normal jugular venous V waves present [Respiration, Rhythm And Depth] : normal respiratory rhythm and effort [Exaggerated Use Of Accessory Muscles For Inspiration] : no accessory muscle use [Auscultation Breath Sounds / Voice Sounds] : lungs were clear to auscultation bilaterally [Heart Rate And Rhythm] : heart rate and rhythm were normal [Murmurs] : no murmurs present [FreeTextEntry1] : irreg 3/6 [facility] MR, trace pitting edema [Abdomen Mass (___ Cm)] : no abdominal mass palpated [Abnormal Walk] : normal gait [Nail Clubbing] : no clubbing of the fingernails [Gait - Sufficient For Exercise Testing] : the gait was sufficient for exercise testing [Cyanosis, Localized] : no localized cyanosis [Petechial Hemorrhages (___cm)] : no petechial hemorrhages [Skin Turgor] : normal skin turgor [] : no rash [Oriented To Time, Place, And Person] : oriented to person, place, and time [Affect] : the affect was normal [Mood] : the mood was normal [No Anxiety] : not feeling anxious

## 2019-08-23 NOTE — DISCUSSION/SUMMARY
[FreeTextEntry1] : Jeanmarie is 84-year-old male with medical history detailed above and active medical issues including:\par \par - Patient has progressive leg and scrotal edema, Recommed Lasix 40mg daily for 3 days then 20 mg daily thereafter, with close monitoring of BP.  \par \par - Multiple evaluations with BENJ[facility], currently rate controlled on Toprol and amiodarone, continue anticoagulation with Eliquis\par \par - Echocardiogram 7/25/19, LVEF 60%, severe MR eccentric jet with flail posterior mitral valve leaflet.  Patient evaluated by CT surgeon Dr Elliott with plan for mitral valve clip September 4, 2019.  Patient is high-risk for open valve repair surgery with advanced age and frail condition.  \par \par - Patient had cardiac catheterization 7/24/19 for recurrent chest pain, nonobstructive coronaries, normal LVEF, moderate severe MR.  Prior to cardiac catheterization symptomatic rapid atrial fibrillation started on Eliquis and Toprol.\par \par - Hypertension at BP goal less than 130/80\par \par - History of asthma\par \par Patient educated on lifestyle and diet modification with low sodium low fat diet and avoidance of excessive alcohol. Patient is aware to call with any symptoms or concerns.  \par \par \par \par

## 2019-08-23 NOTE — REVIEW OF SYSTEMS
[Chest  Pressure] : chest pressure [Dyspnea on exertion] : dyspnea during exertion [Chest Pain] : no chest pain [Lower Ext Edema] : lower extremity edema [Abdominal Pain] : abdominal pain [Negative] : Heme/Lymph [FreeTextEntry1] : progressive scrotal edema

## 2019-08-23 NOTE — REASON FOR VISIT
[Consultation] : a consultation regarding [FreeTextEntry2] : progressive leg and scrotal edema, 4 pound weight gain over one week, HFpEF, severe MR awaiting mitral valve clip surgery Sept 2019 [FreeTextEntry1] : Jeanmarie is a 84-year-old male with history of hypertension, asthma, severe MR, normal LVEF, recurrent rapid AF on Eliquis, amiodarone, Toprol. . Patient seen in the office today with family member. \par \par Patient has progressive leg and scrotal edema. On Lasix 20 mg daily. Little-no improvement. \par \par Patient evaluated by vascular surgery Dr Elliott with plan for mitral valve clip surgery September 2019.  Discussed case with Dr Elliott if possible surgery date will be done sooner.\par \par \par PA Dr Catherine 7/31/19 [facility], mitral valve leaflet redundancy with severe prolapse slight flail leaflet P2 signal with severe torrential MR, elective DC cardioversion performed patient started on amiodarone 200mg daily \par \par Patient evaluated by CT surgeon Dr Elliott with plan for mitral valve clip September 2019.  Patient is high-risk for open valve repair surgery with advanced age and frail condition. \par \par Patient has dyspnea with moderate exertion.  Patient has mild dependent leg edema. Cardiovascular review of symptoms is negative for exertional chest pain, palpitations, dizziness or syncope.  No PND or orthopnea.  No bleeding or black stool.\par \par Patient had rapid AF in office 7/26/19 given intravenous Lopressor 5 mg x1 and Lopressor 25 mg orally with improvement in AF rate below 100 bpm, stable blood pressure 125/70\par \par Echocardiogram 7/25/19, LVEF 60%, severe MR eccentric jet with flail posterior mitral valve leaflet.  Case discussed with cardiothoracic surgeon Dr Fito Elliott.  Patient will have office consult tomorrow. \par \par Patient had cardiac catheterization 7/24/19 for recurrent chest pain, nonobstructive coronaries, normal LVEF, moderate severe MR.  Prior to cardiac catheterization symptomatic rapid atrial fibrillation started on Eliquis and Toprol.\par \par Patient had office evaluation PCP 7/16/19 recurrent chest pain abdominal pain found to be in new-onset rapid atrial fibrillation.  Patient started on Bystolic and Eliquis.  EKG 7/16/19 rapid AF 136bpm, RBBB, NSST.  EKG 7/17/19 sinus rhythm 66 bpm RBBB, LAFB, bifiaicular block\par \par Patient had recurrent atypical chest pain and abdominal pain symptoms.  Contrast CT abdomen negative for aneurysm. Cardiovascular review of symptoms is negative for exertional chest pain, dyspnea, palpitations, dizziness or syncope.  No PND or orthopnea leg edema.  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  Discussed limiting lifting to less than 20lbs. [Family Member] : family member

## 2019-08-26 ENCOUNTER — OUTPATIENT (OUTPATIENT)
Dept: OUTPATIENT SERVICES | Facility: HOSPITAL | Age: 84
LOS: 1 days | End: 2019-08-26
Payer: MEDICARE

## 2019-08-26 ENCOUNTER — APPOINTMENT (OUTPATIENT)
Dept: PULMONOLOGY | Facility: CLINIC | Age: 84
End: 2019-08-26
Payer: MEDICARE

## 2019-08-26 VITALS
WEIGHT: 194.01 LBS | RESPIRATION RATE: 18 BRPM | TEMPERATURE: 98 F | HEIGHT: 67 IN | DIASTOLIC BLOOD PRESSURE: 76 MMHG | SYSTOLIC BLOOD PRESSURE: 133 MMHG | HEART RATE: 60 BPM

## 2019-08-26 DIAGNOSIS — Z01.818 ENCOUNTER FOR OTHER PREPROCEDURAL EXAMINATION: ICD-10-CM

## 2019-08-26 DIAGNOSIS — Z29.9 ENCOUNTER FOR PROPHYLACTIC MEASURES, UNSPECIFIED: ICD-10-CM

## 2019-08-26 DIAGNOSIS — I34.0 NONRHEUMATIC MITRAL (VALVE) INSUFFICIENCY: ICD-10-CM

## 2019-08-26 LAB
ALBUMIN SERPL ELPH-MCNC: 4.1 G/DL — SIGNIFICANT CHANGE UP (ref 3.3–5.2)
ALP SERPL-CCNC: 62 U/L — SIGNIFICANT CHANGE UP (ref 40–120)
ALT FLD-CCNC: 33 U/L — SIGNIFICANT CHANGE UP
ANION GAP SERPL CALC-SCNC: 10 MMOL/L — SIGNIFICANT CHANGE UP (ref 5–17)
APPEARANCE UR: CLEAR — SIGNIFICANT CHANGE UP
APTT BLD: 36.7 SEC — HIGH (ref 27.5–36.3)
AST SERPL-CCNC: 25 U/L — SIGNIFICANT CHANGE UP
BASOPHILS # BLD AUTO: 0.03 K/UL — SIGNIFICANT CHANGE UP (ref 0–0.2)
BASOPHILS NFR BLD AUTO: 0.6 % — SIGNIFICANT CHANGE UP (ref 0–2)
BILIRUB SERPL-MCNC: 1 MG/DL — SIGNIFICANT CHANGE UP (ref 0.4–2)
BILIRUB UR-MCNC: NEGATIVE — SIGNIFICANT CHANGE UP
BLD GP AB SCN SERPL QL: SIGNIFICANT CHANGE UP
BUN SERPL-MCNC: 21 MG/DL — HIGH (ref 8–20)
CALCIUM SERPL-MCNC: 9.1 MG/DL — SIGNIFICANT CHANGE UP (ref 8.6–10.2)
CHLORIDE SERPL-SCNC: 102 MMOL/L — SIGNIFICANT CHANGE UP (ref 98–107)
CO2 SERPL-SCNC: 28 MMOL/L — SIGNIFICANT CHANGE UP (ref 22–29)
COLOR SPEC: YELLOW — SIGNIFICANT CHANGE UP
CREAT SERPL-MCNC: 1.25 MG/DL — SIGNIFICANT CHANGE UP (ref 0.5–1.3)
DIFF PNL FLD: NEGATIVE — SIGNIFICANT CHANGE UP
EOSINOPHIL # BLD AUTO: 0.08 K/UL — SIGNIFICANT CHANGE UP (ref 0–0.5)
EOSINOPHIL NFR BLD AUTO: 1.6 % — SIGNIFICANT CHANGE UP (ref 0–6)
GLUCOSE SERPL-MCNC: 84 MG/DL — SIGNIFICANT CHANGE UP (ref 70–115)
GLUCOSE UR QL: NEGATIVE MG/DL — SIGNIFICANT CHANGE UP
HBA1C BLD-MCNC: 5.7 % — HIGH (ref 4–5.6)
HCT VFR BLD CALC: 43 % — SIGNIFICANT CHANGE UP (ref 39–50)
HGB BLD-MCNC: 13.8 G/DL — SIGNIFICANT CHANGE UP (ref 13–17)
IMM GRANULOCYTES NFR BLD AUTO: 0.2 % — SIGNIFICANT CHANGE UP (ref 0–1.5)
INR BLD: 1.71 RATIO — HIGH (ref 0.88–1.16)
KETONES UR-MCNC: NEGATIVE — SIGNIFICANT CHANGE UP
LEUKOCYTE ESTERASE UR-ACNC: NEGATIVE — SIGNIFICANT CHANGE UP
LYMPHOCYTES # BLD AUTO: 0.93 K/UL — LOW (ref 1–3.3)
LYMPHOCYTES # BLD AUTO: 18.5 % — SIGNIFICANT CHANGE UP (ref 13–44)
MAGNESIUM SERPL-MCNC: 2.4 MG/DL — SIGNIFICANT CHANGE UP (ref 1.6–2.6)
MCHC RBC-ENTMCNC: 29.8 PG — SIGNIFICANT CHANGE UP (ref 27–34)
MCHC RBC-ENTMCNC: 32.1 GM/DL — SIGNIFICANT CHANGE UP (ref 32–36)
MCV RBC AUTO: 92.9 FL — SIGNIFICANT CHANGE UP (ref 80–100)
MONOCYTES # BLD AUTO: 0.45 K/UL — SIGNIFICANT CHANGE UP (ref 0–0.9)
MONOCYTES NFR BLD AUTO: 8.9 % — SIGNIFICANT CHANGE UP (ref 2–14)
MRSA PCR RESULT.: SIGNIFICANT CHANGE UP
NEUTROPHILS # BLD AUTO: 3.53 K/UL — SIGNIFICANT CHANGE UP (ref 1.8–7.4)
NEUTROPHILS NFR BLD AUTO: 70.2 % — SIGNIFICANT CHANGE UP (ref 43–77)
NITRITE UR-MCNC: NEGATIVE — SIGNIFICANT CHANGE UP
NT-PROBNP SERPL-SCNC: 1677 PG/ML — HIGH (ref 0–300)
PH UR: 7 — SIGNIFICANT CHANGE UP (ref 5–8)
PLATELET # BLD AUTO: 202 K/UL — SIGNIFICANT CHANGE UP (ref 150–400)
POTASSIUM SERPL-MCNC: 4.1 MMOL/L — SIGNIFICANT CHANGE UP (ref 3.5–5.3)
POTASSIUM SERPL-SCNC: 4.1 MMOL/L — SIGNIFICANT CHANGE UP (ref 3.5–5.3)
PREALB SERPL-MCNC: 20 MG/DL — SIGNIFICANT CHANGE UP (ref 18–38)
PROT SERPL-MCNC: 6.8 G/DL — SIGNIFICANT CHANGE UP (ref 6.6–8.7)
PROT UR-MCNC: NEGATIVE MG/DL — SIGNIFICANT CHANGE UP
PROTHROM AB SERPL-ACNC: 20 SEC — HIGH (ref 10–12.9)
RBC # BLD: 4.63 M/UL — SIGNIFICANT CHANGE UP (ref 4.2–5.8)
RBC # FLD: 14.9 % — HIGH (ref 10.3–14.5)
S AUREUS DNA NOSE QL NAA+PROBE: SIGNIFICANT CHANGE UP
SODIUM SERPL-SCNC: 140 MMOL/L — SIGNIFICANT CHANGE UP (ref 135–145)
SP GR SPEC: 1.01 — SIGNIFICANT CHANGE UP (ref 1.01–1.02)
T3 SERPL-MCNC: 59 NG/DL — LOW (ref 80–200)
T4 AB SER-ACNC: 6.6 UG/DL — SIGNIFICANT CHANGE UP (ref 4.5–12)
TSH SERPL-MCNC: 5.65 UIU/ML — HIGH (ref 0.27–4.2)
UROBILINOGEN FLD QL: NEGATIVE MG/DL — SIGNIFICANT CHANGE UP
WBC # BLD: 5.03 K/UL — SIGNIFICANT CHANGE UP (ref 3.8–10.5)
WBC # FLD AUTO: 5.03 K/UL — SIGNIFICANT CHANGE UP (ref 3.8–10.5)

## 2019-08-26 PROCEDURE — 71046 X-RAY EXAM CHEST 2 VIEWS: CPT | Mod: 26

## 2019-08-26 PROCEDURE — 85018 HEMOGLOBIN: CPT | Mod: QW

## 2019-08-26 PROCEDURE — 94729 DIFFUSING CAPACITY: CPT

## 2019-08-26 PROCEDURE — 94010 BREATHING CAPACITY TEST: CPT

## 2019-08-26 PROCEDURE — 94727 GAS DIL/WSHOT DETER LNG VOL: CPT

## 2019-08-26 PROCEDURE — 93010 ELECTROCARDIOGRAM REPORT: CPT

## 2019-08-26 RX ORDER — SODIUM CHLORIDE 9 MG/ML
3 INJECTION INTRAMUSCULAR; INTRAVENOUS; SUBCUTANEOUS EVERY 8 HOURS
Refills: 0 | Status: DISCONTINUED | OUTPATIENT
Start: 2019-09-04 | End: 2019-09-05

## 2019-08-26 NOTE — H&P PST ADULT - NSANTHOSAYNRD_GEN_A_CORE
No. CHIKA screening performed.  STOP BANG Legend: 0-2 = LOW Risk; 3-4 = INTERMEDIATE Risk; 5-8 = HIGH Risk

## 2019-08-26 NOTE — H&P PST ADULT - HISTORY OF PRESENT ILLNESS
Pt is a 85 y/o male with medical history of mitral valve insufficency, Afib, s/p cardiac ablation, who presents for evaluation and testing for mitral clip insertion. Pt states he went to PCP because of fatigue 5 weeks ago. PCP completed an EKG in office which revealed atrial fibrillation. Pt was referred to cardiologist Dr. Valentino who performed an echocardiogram in office which revealed mitral valve insufficiency. Pt was sent to [facility] for cardiac catheterization which verified "moderate to severe mitral valve regurgitation" as per cardiothoracic consult note. Pt also had echocardiogram which showed "LVEF 60%, severe MR eccentric jet with flail posterior mitral valve leaflet" as per cardiothoracic consult note. Dr.Valentino started pt on AC therapy for Afib and pt had a cardiac ablation with PA @ [facility]. PA showed "severe prolapse of posterior mitral leaflet with slight flail of P2 segment, severe torrential mitral regurgitation and moderate tricuspid regurg". Pt was referred to Dr. Burgess who advised pt to have surgical intervention for treatment. Pt c/o that he has bilateral lower extremity swelling which has been resolving since taking prescribed Lasix. Pt denies chest pain, palpitations, SOB, fatigue.

## 2019-08-26 NOTE — H&P PST ADULT - NSICDXPROBLEM_GEN_ALL_CORE_FT
PROBLEM DIAGNOSES  Problem: Prophylactic measure  Assessment and Plan: caprini score 6,scds ordered, surgical personnel to assess for pharm proph    Problem: Mitral insufficiency  Assessment and Plan: mitral clip

## 2019-08-26 NOTE — H&P PST ADULT - ASSESSMENT
OPIOID RISK TOOL    COREY EACH BOX THAT APPLIES AND ADD TOTALS AT THE END    FAMILY HISTORY OF SUBSTANCE ABUSE                 FEMALE         MALE                                                Alcohol                             [  ]1 pt          [  ]3pts                                               Illegal Durgs                     [  ]2 pts        [  ]3pts                                               Rx Drugs                           [  ]4 pts        [  ]4 pts    PERSONAL HISTORY OF SUBSTANCE ABUSE                                                                                          Alcohol                             [  ]3 pts       [  ]3 pts                                               Illegal Drugs                     [  ]4 pts        [  ]4 pts                                               Rx Drugs                           [  ]5 pts        [  ]5 pts    AGE BETWEEN 16-45 YEARS                                      [  ]1 pt         [  ]1 pt    HISTORY OF PREADOLESCENT   SEXUAL ABUSE                                                             [  ]3 pts        [  ]0pts    PSYCHOLOGICAL DISEASE                     ADD, OCD, Bipolar, Schizophrenia        [  ]2 pts         [  ]2 pts                      Depression                                               [  ]1 pt           [  ]1 pt           SCORING TOTAL   (add numbers and type here)              (0)                                     A score of 3 or lower indicated LOW risk for future opioid abuse  A score of 4 to 7 indicated moderate risk for future opioid abuse  A score of 8 or higher indicates a high risk for opioid abuse  CAPRINI VTE 2.0 SCORE [CLOT updated 2019]    AGE RELATED RISK FACTORS                                                       MOBILITY RELATED FACTORS  [ ] Age 41-60 years                                            (1 Point)                    [ ] Bed rest                                                        (1 Point)  [ ] Age: 61-74 years                                           (2 Points)                  [ ] Plaster cast                                                   (2 Points)  [x ] Age= 75 years                                              (3 Points)                    [ ] Bed bound for more than 72 hours                 (2 Points)    DISEASE RELATED RISK FACTORS                                               GENDER SPECIFIC FACTORS  [ x] Edema in the lower extremities                       (1 Point)              [ ] Pregnancy                                                     (1 Point)  [ ] Varicose veins                                               (1 Point)                     [ ] Post-partum < 6 weeks                                   (1 Point)             [ ] BMI > 25 Kg/m2                                            (1 Point)                     [ ] Hormonal therapy  or oral contraception          (1 Point)                 [ ] Sepsis (in the previous month)                        (1 Point)               [ ] History of pregnancy complications                 (1 point)  [ ] Pneumonia or serious lung disease                                               [ ] Unexplained or recurrent                     (1 Point)           (in the previous month)                               (1 Point)  [ ] Abnormal pulmonary function test                     (1 Point)                 SURGERY RELATED RISK FACTORS  [ ] Acute myocardial infarction                              (1 Point)               [ ]  Section                                             (1 Point)  [ ] Congestive heart failure (in the previous month)  (1 Point)      [ ] Minor surgery                                                  (1 Point)   [ ] Inflammatory bowel disease                             (1 Point)               [ ] Arthroscopic surgery                                        (2 Points)  [ ] Central venous access                                      (2 Points)                [ x] General surgery lasting more than 45 minutes (2 points)  [ ] Malignancy- Present or previous                   (2 Points)                [ ] Elective arthroplasty                                         (5 points)    [ ] Stroke (in the previous month)                          (5 Points)                                                                                                                                                           HEMATOLOGY RELATED FACTORS                                                 TRAUMA RELATED RISK FACTORS  [ ] Prior episodes of VTE                                     (3 Points)                [ ] Fracture of the hip, pelvis, or leg                       (5 Points)  [ ] Positive family history for VTE                         (3 Points)             [ ] Acute spinal cord injury (in the previous month)  (5 Points)  [ ] Prothrombin 17264 A                                     (3 Points)               [ ] Paralysis  (less than 1 month)                             (5 Points)  [ ] Factor V Leiden                                             (3 Points)                  [ ] Multiple Trauma within 1 month                        (5 Points)  [ ] Lupus anticoagulants                                     (3 Points)                                                           [ ] Anticardiolipin antibodies                               (3 Points)                                                       [ ] High homocysteine in the blood                      (3 Points)                                             [ ] Other congenital or acquired thrombophilia      (3 Points)                                                [ ] Heparin induced thrombocytopenia                  (3 Points)                                     Total Score [     6     ] Pt is a 85 y/o male with medical history of mitral valve insufficency, Afib, s/p cardiac ablation, who presents for evaluation and testing for mitral clip insertion. Pt states he went to PCP because of fatigue 5 weeks ago. PCP completed an EKG in office which revealed atrial fibrillation. Pt was referred to cardiologist Dr. Valentino who performed an echocardiogram in office which revealed mitral valve insufficiency. Pt was sent to [facility] for cardiac catheterization which verified "moderate to severe mitral valve regurgitation" as per cardiothoracic consult note. Pt also had echocardiogram which showed "LVEF 60%, severe MR eccentric jet with flail posterior mitral valve leaflet" as per cardiothoracic consult note. Dr.Valentino started pt on AC therapy for Afib and pt had a cardiac ablation with PA @ [facility]. PA showed "severe prolapse of posterior mitral leaflet with slight flail of P2 segment, severe torrential mitral regurgitation and moderate tricuspid regurg". Pt was referred to Dr. Burgess who advised pt to have surgical intervention for treatment. Pt c/o that he has bilateral lower extremity swelling which has been resolving since taking prescribed Lasix. Pt denies chest pain, palpitations, SOB, fatigue.  OPIOID RISK TOOL    COREY EACH BOX THAT APPLIES AND ADD TOTALS AT THE END    FAMILY HISTORY OF SUBSTANCE ABUSE                 FEMALE         MALE                                                Alcohol                             [  ]1 pt          [  ]3pts                                               Illegal Durgs                     [  ]2 pts        [  ]3pts                                               Rx Drugs                           [  ]4 pts        [  ]4 pts    PERSONAL HISTORY OF SUBSTANCE ABUSE                                                                                          Alcohol                             [  ]3 pts       [  ]3 pts                                               Illegal Drugs                     [  ]4 pts        [  ]4 pts                                               Rx Drugs                           [  ]5 pts        [  ]5 pts    AGE BETWEEN 16-45 YEARS                                      [  ]1 pt         [  ]1 pt    HISTORY OF PREADOLESCENT   SEXUAL ABUSE                                                             [  ]3 pts        [  ]0pts    PSYCHOLOGICAL DISEASE                     ADD, OCD, Bipolar, Schizophrenia        [  ]2 pts         [  ]2 pts                      Depression                                               [  ]1 pt           [  ]1 pt           SCORING TOTAL   (add numbers and type here)              (0)                                     A score of 3 or lower indicated LOW risk for future opioid abuse  A score of 4 to 7 indicated moderate risk for future opioid abuse  A score of 8 or higher indicates a high risk for opioid abuse  CAPRINI VTE 2.0 SCORE [CLOT updated 2019]    AGE RELATED RISK FACTORS                                                       MOBILITY RELATED FACTORS  [ ] Age 41-60 years                                            (1 Point)                    [ ] Bed rest                                                        (1 Point)  [ ] Age: 61-74 years                                           (2 Points)                  [ ] Plaster cast                                                   (2 Points)  [x ] Age= 75 years                                              (3 Points)                    [ ] Bed bound for more than 72 hours                 (2 Points)    DISEASE RELATED RISK FACTORS                                               GENDER SPECIFIC FACTORS  [ x] Edema in the lower extremities                       (1 Point)              [ ] Pregnancy                                                     (1 Point)  [ ] Varicose veins                                               (1 Point)                     [ ] Post-partum < 6 weeks                                   (1 Point)             [ ] BMI > 25 Kg/m2                                            (1 Point)                     [ ] Hormonal therapy  or oral contraception          (1 Point)                 [ ] Sepsis (in the previous month)                        (1 Point)               [ ] History of pregnancy complications                 (1 point)  [ ] Pneumonia or serious lung disease                                               [ ] Unexplained or recurrent                     (1 Point)           (in the previous month)                               (1 Point)  [ ] Abnormal pulmonary function test                     (1 Point)                 SURGERY RELATED RISK FACTORS  [ ] Acute myocardial infarction                              (1 Point)               [ ]  Section                                             (1 Point)  [ ] Congestive heart failure (in the previous month)  (1 Point)      [ ] Minor surgery                                                  (1 Point)   [ ] Inflammatory bowel disease                             (1 Point)               [ ] Arthroscopic surgery                                        (2 Points)  [ ] Central venous access                                      (2 Points)                [ x] General surgery lasting more than 45 minutes (2 points)  [ ] Malignancy- Present or previous                   (2 Points)                [ ] Elective arthroplasty                                         (5 points)    [ ] Stroke (in the previous month)                          (5 Points)                                                                                                                                                           HEMATOLOGY RELATED FACTORS                                                 TRAUMA RELATED RISK FACTORS  [ ] Prior episodes of VTE                                     (3 Points)                [ ] Fracture of the hip, pelvis, or leg                       (5 Points)  [ ] Positive family history for VTE                         (3 Points)             [ ] Acute spinal cord injury (in the previous month)  (5 Points)  [ ] Prothrombin 68281 A                                     (3 Points)               [ ] Paralysis  (less than 1 month)                             (5 Points)  [ ] Factor V Leiden                                             (3 Points)                  [ ] Multiple Trauma within 1 month                        (5 Points)  [ ] Lupus anticoagulants                                     (3 Points)                                                           [ ] Anticardiolipin antibodies                               (3 Points)                                                       [ ] High homocysteine in the blood                      (3 Points)                                             [ ] Other congenital or acquired thrombophilia      (3 Points)                                                [ ] Heparin induced thrombocytopenia                  (3 Points)                                     Total Score [     6     ]

## 2019-08-27 LAB
CULTURE RESULTS: NO GROWTH — SIGNIFICANT CHANGE UP
SPECIMEN SOURCE: SIGNIFICANT CHANGE UP

## 2019-09-04 ENCOUNTER — INPATIENT (INPATIENT)
Facility: HOSPITAL | Age: 84
LOS: 0 days | Discharge: HOME | DRG: 229 | End: 2019-09-05
Attending: THORACIC SURGERY (CARDIOTHORACIC VASCULAR SURGERY) | Admitting: THORACIC SURGERY (CARDIOTHORACIC VASCULAR SURGERY)
Payer: MEDICARE

## 2019-09-04 ENCOUNTER — APPOINTMENT (OUTPATIENT)
Dept: CARDIOTHORACIC SURGERY | Facility: HOSPITAL | Age: 84
End: 2019-09-04

## 2019-09-04 VITALS
HEIGHT: 67 IN | WEIGHT: 194.01 LBS | SYSTOLIC BLOOD PRESSURE: 126 MMHG | TEMPERATURE: 98 F | OXYGEN SATURATION: 99 % | RESPIRATION RATE: 14 BRPM | HEART RATE: 61 BPM | DIASTOLIC BLOOD PRESSURE: 60 MMHG

## 2019-09-04 DIAGNOSIS — I34.0 NONRHEUMATIC MITRAL (VALVE) INSUFFICIENCY: ICD-10-CM

## 2019-09-04 PROBLEM — I48.91 UNSPECIFIED ATRIAL FIBRILLATION: Chronic | Status: ACTIVE | Noted: 2019-08-26

## 2019-09-04 LAB
ABO RH CONFIRMATION: SIGNIFICANT CHANGE UP
ALBUMIN SERPL ELPH-MCNC: 3.5 G/DL — SIGNIFICANT CHANGE UP (ref 3.3–5.2)
ALP SERPL-CCNC: 56 U/L — SIGNIFICANT CHANGE UP (ref 40–120)
ALT FLD-CCNC: 26 U/L — SIGNIFICANT CHANGE UP
ANION GAP SERPL CALC-SCNC: 10 MMOL/L — SIGNIFICANT CHANGE UP (ref 5–17)
APTT BLD: 30.4 SEC — SIGNIFICANT CHANGE UP (ref 27.5–36.3)
APTT BLD: 36.6 SEC — HIGH (ref 27.5–36.3)
AST SERPL-CCNC: 23 U/L — SIGNIFICANT CHANGE UP
BILIRUB DIRECT SERPL-MCNC: 0.2 MG/DL — SIGNIFICANT CHANGE UP (ref 0–0.3)
BILIRUB INDIRECT FLD-MCNC: 0.5 MG/DL — SIGNIFICANT CHANGE UP (ref 0.2–1)
BILIRUB SERPL-MCNC: 0.7 MG/DL — SIGNIFICANT CHANGE UP (ref 0.4–2)
BUN SERPL-MCNC: 24 MG/DL — HIGH (ref 8–20)
CALCIUM SERPL-MCNC: 8.5 MG/DL — LOW (ref 8.6–10.2)
CHLORIDE SERPL-SCNC: 106 MMOL/L — SIGNIFICANT CHANGE UP (ref 98–107)
CK SERPL-CCNC: 57 U/L — SIGNIFICANT CHANGE UP (ref 30–200)
CO2 SERPL-SCNC: 23 MMOL/L — SIGNIFICANT CHANGE UP (ref 22–29)
CREAT SERPL-MCNC: 0.93 MG/DL — SIGNIFICANT CHANGE UP (ref 0.5–1.3)
GAS PNL BLDA: SIGNIFICANT CHANGE UP
GLUCOSE SERPL-MCNC: 101 MG/DL — SIGNIFICANT CHANGE UP (ref 70–115)
HCT VFR BLD CALC: 39.1 % — SIGNIFICANT CHANGE UP (ref 39–50)
HGB BLD-MCNC: 12.6 G/DL — LOW (ref 13–17)
INR BLD: 1.13 RATIO — SIGNIFICANT CHANGE UP (ref 0.88–1.16)
INR BLD: 1.24 RATIO — HIGH (ref 0.88–1.16)
MAGNESIUM SERPL-MCNC: 1.8 MG/DL — SIGNIFICANT CHANGE UP (ref 1.6–2.6)
MCHC RBC-ENTMCNC: 29.3 PG — SIGNIFICANT CHANGE UP (ref 27–34)
MCHC RBC-ENTMCNC: 32.2 GM/DL — SIGNIFICANT CHANGE UP (ref 32–36)
MCV RBC AUTO: 90.9 FL — SIGNIFICANT CHANGE UP (ref 80–100)
PHOSPHATE SERPL-MCNC: 2.9 MG/DL — SIGNIFICANT CHANGE UP (ref 2.4–4.7)
PLATELET # BLD AUTO: 172 K/UL — SIGNIFICANT CHANGE UP (ref 150–400)
POTASSIUM SERPL-MCNC: 4.3 MMOL/L — SIGNIFICANT CHANGE UP (ref 3.5–5.3)
POTASSIUM SERPL-SCNC: 4.3 MMOL/L — SIGNIFICANT CHANGE UP (ref 3.5–5.3)
PROT SERPL-MCNC: 6.2 G/DL — LOW (ref 6.6–8.7)
PROTHROM AB SERPL-ACNC: 13 SEC — HIGH (ref 10–12.9)
PROTHROM AB SERPL-ACNC: 14.4 SEC — HIGH (ref 10–12.9)
RBC # BLD: 4.3 M/UL — SIGNIFICANT CHANGE UP (ref 4.2–5.8)
RBC # FLD: 14.5 % — SIGNIFICANT CHANGE UP (ref 10.3–14.5)
SODIUM SERPL-SCNC: 139 MMOL/L — SIGNIFICANT CHANGE UP (ref 135–145)
TROPONIN T SERPL-MCNC: <0.01 NG/ML — SIGNIFICANT CHANGE UP (ref 0–0.06)
WBC # BLD: 3.24 K/UL — LOW (ref 3.8–10.5)
WBC # FLD AUTO: 3.24 K/UL — LOW (ref 3.8–10.5)

## 2019-09-04 PROCEDURE — 93355 ECHO TRANSESOPHAGEAL (TEE): CPT

## 2019-09-04 PROCEDURE — 87640 STAPH A DNA AMP PROBE: CPT

## 2019-09-04 PROCEDURE — 85730 THROMBOPLASTIN TIME PARTIAL: CPT

## 2019-09-04 PROCEDURE — 86901 BLOOD TYPING SEROLOGIC RH(D): CPT

## 2019-09-04 PROCEDURE — G0463: CPT

## 2019-09-04 PROCEDURE — 83036 HEMOGLOBIN GLYCOSYLATED A1C: CPT

## 2019-09-04 PROCEDURE — 86923 COMPATIBILITY TEST ELECTRIC: CPT

## 2019-09-04 PROCEDURE — 86900 BLOOD TYPING SEROLOGIC ABO: CPT

## 2019-09-04 PROCEDURE — 71045 X-RAY EXAM CHEST 1 VIEW: CPT | Mod: 26

## 2019-09-04 PROCEDURE — 84134 ASSAY OF PREALBUMIN: CPT

## 2019-09-04 PROCEDURE — 36415 COLL VENOUS BLD VENIPUNCTURE: CPT

## 2019-09-04 PROCEDURE — 87086 URINE CULTURE/COLONY COUNT: CPT

## 2019-09-04 PROCEDURE — 80053 COMPREHEN METABOLIC PANEL: CPT

## 2019-09-04 PROCEDURE — 84436 ASSAY OF TOTAL THYROXINE: CPT

## 2019-09-04 PROCEDURE — 93005 ELECTROCARDIOGRAM TRACING: CPT

## 2019-09-04 PROCEDURE — 83880 ASSAY OF NATRIURETIC PEPTIDE: CPT

## 2019-09-04 PROCEDURE — 33418 REPAIR TCAT MITRAL VALVE: CPT | Mod: Q0,62

## 2019-09-04 PROCEDURE — 84480 ASSAY TRIIODOTHYRONINE (T3): CPT

## 2019-09-04 PROCEDURE — 84443 ASSAY THYROID STIM HORMONE: CPT

## 2019-09-04 PROCEDURE — 87641 MR-STAPH DNA AMP PROBE: CPT

## 2019-09-04 PROCEDURE — 76376 3D RENDER W/INTRP POSTPROCES: CPT | Mod: 26,59

## 2019-09-04 PROCEDURE — 85027 COMPLETE CBC AUTOMATED: CPT

## 2019-09-04 PROCEDURE — 85610 PROTHROMBIN TIME: CPT

## 2019-09-04 PROCEDURE — 83735 ASSAY OF MAGNESIUM: CPT

## 2019-09-04 PROCEDURE — 86850 RBC ANTIBODY SCREEN: CPT

## 2019-09-04 PROCEDURE — 71046 X-RAY EXAM CHEST 2 VIEWS: CPT

## 2019-09-04 PROCEDURE — 93010 ELECTROCARDIOGRAM REPORT: CPT

## 2019-09-04 PROCEDURE — 81003 URINALYSIS AUTO W/O SCOPE: CPT

## 2019-09-04 RX ORDER — AMIODARONE HYDROCHLORIDE 400 MG/1
200 TABLET ORAL DAILY
Refills: 0 | Status: DISCONTINUED | OUTPATIENT
Start: 2019-09-05 | End: 2019-09-05

## 2019-09-04 RX ORDER — MAGNESIUM SULFATE 500 MG/ML
2 VIAL (ML) INJECTION ONCE
Refills: 0 | Status: COMPLETED | OUTPATIENT
Start: 2019-09-04 | End: 2019-09-04

## 2019-09-04 RX ORDER — MULTIVIT-MIN/FERROUS GLUCONATE 9 MG/15 ML
1 LIQUID (ML) ORAL
Qty: 0 | Refills: 0 | DISCHARGE

## 2019-09-04 RX ORDER — ASPIRIN/CALCIUM CARB/MAGNESIUM 324 MG
81 TABLET ORAL DAILY
Refills: 0 | Status: DISCONTINUED | OUTPATIENT
Start: 2019-09-05 | End: 2019-09-05

## 2019-09-04 RX ORDER — METOPROLOL TARTRATE 50 MG
1 TABLET ORAL
Qty: 0 | Refills: 0 | DISCHARGE

## 2019-09-04 RX ORDER — FUROSEMIDE 40 MG
20 TABLET ORAL DAILY
Refills: 0 | Status: DISCONTINUED | OUTPATIENT
Start: 2019-09-05 | End: 2019-09-05

## 2019-09-04 RX ORDER — INFLUENZA VIRUS VACCINE 15; 15; 15; 15 UG/.5ML; UG/.5ML; UG/.5ML; UG/.5ML
0.5 SUSPENSION INTRAMUSCULAR ONCE
Refills: 0 | Status: COMPLETED | OUTPATIENT
Start: 2019-09-04 | End: 2019-09-04

## 2019-09-04 RX ORDER — PANTOPRAZOLE SODIUM 20 MG/1
40 TABLET, DELAYED RELEASE ORAL
Refills: 0 | Status: DISCONTINUED | OUTPATIENT
Start: 2019-09-05 | End: 2019-09-05

## 2019-09-04 RX ORDER — CEFUROXIME AXETIL 250 MG
1500 TABLET ORAL EVERY 8 HOURS
Refills: 0 | Status: COMPLETED | OUTPATIENT
Start: 2019-09-04 | End: 2019-09-05

## 2019-09-04 RX ORDER — PANTOPRAZOLE SODIUM 20 MG/1
40 TABLET, DELAYED RELEASE ORAL ONCE
Refills: 0 | Status: COMPLETED | OUTPATIENT
Start: 2019-09-04 | End: 2019-09-04

## 2019-09-04 RX ORDER — METOPROLOL TARTRATE 50 MG
100 TABLET ORAL DAILY
Refills: 0 | Status: DISCONTINUED | OUTPATIENT
Start: 2019-09-05 | End: 2019-09-05

## 2019-09-04 RX ORDER — FUROSEMIDE 40 MG
1 TABLET ORAL
Qty: 0 | Refills: 0 | DISCHARGE

## 2019-09-04 RX ORDER — AMIODARONE HYDROCHLORIDE 400 MG/1
1 TABLET ORAL
Qty: 0 | Refills: 0 | DISCHARGE

## 2019-09-04 RX ADMIN — PANTOPRAZOLE SODIUM 40 MILLIGRAM(S): 20 TABLET, DELAYED RELEASE ORAL at 14:00

## 2019-09-04 RX ADMIN — Medication 50 GRAM(S): at 16:43

## 2019-09-04 RX ADMIN — Medication 100 MILLIGRAM(S): at 17:06

## 2019-09-04 RX ADMIN — SODIUM CHLORIDE 3 MILLILITER(S): 9 INJECTION INTRAMUSCULAR; INTRAVENOUS; SUBCUTANEOUS at 13:57

## 2019-09-04 RX ADMIN — SODIUM CHLORIDE 3 MILLILITER(S): 9 INJECTION INTRAMUSCULAR; INTRAVENOUS; SUBCUTANEOUS at 21:10

## 2019-09-04 NOTE — BRIEF OPERATIVE NOTE - NSICDXBRIEFPROCEDURE_GEN_ALL_CORE_FT
PROCEDURES:  Percutaneous transcatheter repair of mitral valve using one leaflet clip 04-Sep-2019 13:02:00 Percutaneous Mitral Valve Repair Utilizing the Mitral Clip via Right Common Femoral Vein (1 clip deployed) (NCT# [id number]) (STS/ACC TVT Registry Patient ID# [id number]) Santosh Moreno

## 2019-09-04 NOTE — BRIEF OPERATIVE NOTE - NSICDXBRIEFPOSTOP_GEN_ALL_CORE_FT
POST-OP DIAGNOSIS:  Chronic diastolic CHF (congestive heart failure), NYHA class 2 04-Sep-2019 13:01:39  Santosh Moreno  Severe mitral regurgitation 04-Sep-2019 13:01:31  Santosh Moreno

## 2019-09-04 NOTE — CHART NOTE - NSCHARTNOTEFT_GEN_A_CORE
[employer] Percutaneous Mitral Valve Repair Utilizing the Mitral Clip via Right Common Femoral Vein.  NCT# [id number], STS/ACC TVT Registry Patient ID# [id number].

## 2019-09-04 NOTE — BRIEF OPERATIVE NOTE - NSICDXBRIEFPREOP_GEN_ALL_CORE_FT
PRE-OP DIAGNOSIS:  Chronic diastolic CHF (congestive heart failure), NYHA class 2 04-Sep-2019 13:01:20  Santosh Moreno  Severe mitral regurgitation 04-Sep-2019 13:01:10  Santosh Moreno

## 2019-09-05 ENCOUNTER — TRANSCRIPTION ENCOUNTER (OUTPATIENT)
Age: 84
End: 2019-09-05

## 2019-09-05 VITALS
DIASTOLIC BLOOD PRESSURE: 60 MMHG | HEART RATE: 65 BPM | RESPIRATION RATE: 18 BRPM | SYSTOLIC BLOOD PRESSURE: 120 MMHG | OXYGEN SATURATION: 98 % | TEMPERATURE: 98 F

## 2019-09-05 DIAGNOSIS — I34.0 NONRHEUMATIC MITRAL (VALVE) INSUFFICIENCY: ICD-10-CM

## 2019-09-05 DIAGNOSIS — I48.2 CHRONIC ATRIAL FIBRILLATION: ICD-10-CM

## 2019-09-05 DIAGNOSIS — N18.3 CHRONIC KIDNEY DISEASE, STAGE 3 (MODERATE): ICD-10-CM

## 2019-09-05 LAB
ALBUMIN SERPL ELPH-MCNC: 3.4 G/DL — SIGNIFICANT CHANGE UP (ref 3.3–5.2)
ALP SERPL-CCNC: 56 U/L — SIGNIFICANT CHANGE UP (ref 40–120)
ALT FLD-CCNC: 24 U/L — SIGNIFICANT CHANGE UP
ANION GAP SERPL CALC-SCNC: 9 MMOL/L — SIGNIFICANT CHANGE UP (ref 5–17)
APTT BLD: 27.3 SEC — LOW (ref 27.5–36.3)
AST SERPL-CCNC: 19 U/L — SIGNIFICANT CHANGE UP
BILIRUB DIRECT SERPL-MCNC: 0.2 MG/DL — SIGNIFICANT CHANGE UP (ref 0–0.3)
BILIRUB INDIRECT FLD-MCNC: 0.3 MG/DL — SIGNIFICANT CHANGE UP (ref 0.2–1)
BILIRUB SERPL-MCNC: 0.5 MG/DL — SIGNIFICANT CHANGE UP (ref 0.4–2)
BUN SERPL-MCNC: 25 MG/DL — HIGH (ref 8–20)
CALCIUM SERPL-MCNC: 8.2 MG/DL — LOW (ref 8.6–10.2)
CHLORIDE SERPL-SCNC: 104 MMOL/L — SIGNIFICANT CHANGE UP (ref 98–107)
CK SERPL-CCNC: 48 U/L — SIGNIFICANT CHANGE UP (ref 30–200)
CO2 SERPL-SCNC: 22 MMOL/L — SIGNIFICANT CHANGE UP (ref 22–29)
CREAT SERPL-MCNC: 0.98 MG/DL — SIGNIFICANT CHANGE UP (ref 0.5–1.3)
GLUCOSE SERPL-MCNC: 157 MG/DL — HIGH (ref 70–115)
HCT VFR BLD CALC: 39.6 % — SIGNIFICANT CHANGE UP (ref 39–50)
HGB BLD-MCNC: 12.9 G/DL — LOW (ref 13–17)
INR BLD: 1.13 RATIO — SIGNIFICANT CHANGE UP (ref 0.88–1.16)
MAGNESIUM SERPL-MCNC: 2.2 MG/DL — SIGNIFICANT CHANGE UP (ref 1.6–2.6)
MCHC RBC-ENTMCNC: 29.6 PG — SIGNIFICANT CHANGE UP (ref 27–34)
MCHC RBC-ENTMCNC: 32.6 GM/DL — SIGNIFICANT CHANGE UP (ref 32–36)
MCV RBC AUTO: 90.8 FL — SIGNIFICANT CHANGE UP (ref 80–100)
PHOSPHATE SERPL-MCNC: 2.2 MG/DL — LOW (ref 2.4–4.7)
PLATELET # BLD AUTO: 203 K/UL — SIGNIFICANT CHANGE UP (ref 150–400)
POTASSIUM SERPL-MCNC: 4.1 MMOL/L — SIGNIFICANT CHANGE UP (ref 3.5–5.3)
POTASSIUM SERPL-SCNC: 4.1 MMOL/L — SIGNIFICANT CHANGE UP (ref 3.5–5.3)
PROT SERPL-MCNC: 6.1 G/DL — LOW (ref 6.6–8.7)
PROTHROM AB SERPL-ACNC: 13.1 SEC — HIGH (ref 10–12.9)
RBC # BLD: 4.36 M/UL — SIGNIFICANT CHANGE UP (ref 4.2–5.8)
RBC # FLD: 14.3 % — SIGNIFICANT CHANGE UP (ref 10.3–14.5)
SODIUM SERPL-SCNC: 135 MMOL/L — SIGNIFICANT CHANGE UP (ref 135–145)
TROPONIN T SERPL-MCNC: <0.01 NG/ML — SIGNIFICANT CHANGE UP (ref 0–0.06)
WBC # BLD: 6.65 K/UL — SIGNIFICANT CHANGE UP (ref 3.8–10.5)
WBC # FLD AUTO: 6.65 K/UL — SIGNIFICANT CHANGE UP (ref 3.8–10.5)

## 2019-09-05 PROCEDURE — 93010 ELECTROCARDIOGRAM REPORT: CPT

## 2019-09-05 PROCEDURE — 71045 X-RAY EXAM CHEST 1 VIEW: CPT | Mod: 26

## 2019-09-05 RX ORDER — APIXABAN 2.5 MG/1
5 TABLET, FILM COATED ORAL
Refills: 0 | Status: DISCONTINUED | OUTPATIENT
Start: 2019-09-06 | End: 2019-09-05

## 2019-09-05 RX ORDER — APIXABAN 2.5 MG/1
1 TABLET, FILM COATED ORAL
Qty: 0 | Refills: 0 | DISCHARGE

## 2019-09-05 RX ORDER — ASPIRIN/CALCIUM CARB/MAGNESIUM 324 MG
1 TABLET ORAL
Qty: 0 | Refills: 0 | DISCHARGE
Start: 2019-09-05

## 2019-09-05 RX ADMIN — PANTOPRAZOLE SODIUM 40 MILLIGRAM(S): 20 TABLET, DELAYED RELEASE ORAL at 07:11

## 2019-09-05 RX ADMIN — Medication 100 MILLIGRAM(S): at 09:00

## 2019-09-05 RX ADMIN — Medication 100 MILLIGRAM(S): at 01:09

## 2019-09-05 RX ADMIN — SODIUM CHLORIDE 3 MILLILITER(S): 9 INJECTION INTRAMUSCULAR; INTRAVENOUS; SUBCUTANEOUS at 06:27

## 2019-09-05 RX ADMIN — SODIUM CHLORIDE 3 MILLILITER(S): 9 INJECTION INTRAMUSCULAR; INTRAVENOUS; SUBCUTANEOUS at 13:25

## 2019-09-05 RX ADMIN — Medication 81 MILLIGRAM(S): at 11:26

## 2019-09-05 RX ADMIN — Medication 20 MILLIGRAM(S): at 07:11

## 2019-09-05 RX ADMIN — Medication 62.5 MILLIMOLE(S): at 04:24

## 2019-09-05 NOTE — DISCHARGE NOTE PROVIDER - HOSPITAL COURSE
Patient was admitted on 9/4/19 and underwent a Right transfemoral transcatheter Mitral clip x1 on day of admission.  He was extubated in the OR and transported to CTICU in stable condition.      < from: TTE Echo Complete w/Doppler (09.04.19 @ 16:27) >    Summary:     1. Left ventricular ejection fraction, by visual estimation, is 50 to 55%.     2. Low normal global left ventricular systolic function.     3. Spectral Doppler shows impaired relaxation pattern of left ventricular myocardial filling (Grade I diastolic dysfunction).     4. Normal right ventricular size and function.     5. Mildly enlarged left atrium.     6. The right atrium is normal in size.     7. There is no evidence of pericardial effusion.     8. Trace mitral valve regurgitation.     9. S/p mitral clip.    10. Sclerotic aortic valve with normal opening.    11. Endocardial visualization was enhanced with intravenous echo contrast.    < end of copied text >    9/5 Post op day 1: right groin stitch was removed without complication, ambulated, voided, and stable for discharge.

## 2019-09-05 NOTE — PROGRESS NOTE ADULT - SUBJECTIVE AND OBJECTIVE BOX
Brief Hospital Course:     Significant recent/past 24 hr events:    Subjective:    ROS negative x 10 systems except as noted above    Patient is a 84y old  Male who presents with a chief complaint of "I am getting an mitral clip" (26 Aug 2019 12:15)    HPI:  Pt is a 83 y/o male with medical history of mitral valve insufficency, Afib, s/p cardiac ablation, who presents for evaluation and testing for mitral clip insertion. Pt states he went to PCP because of fatigue 5 weeks ago. PCP completed an EKG in office which revealed atrial fibrillation. Pt was referred to cardiologist Dr. Valentino who performed an echocardiogram in office which revealed mitral valve insufficiency. Pt was sent to [facility] for cardiac catheterization which verified "moderate to severe mitral valve regurgitation" as per cardiothoracic consult note. Pt also had echocardiogram which showed "LVEF 60%, severe MR eccentric jet with flail posterior mitral valve leaflet" as per cardiothoracic consult note. Dr.Valentino started pt on AC therapy for Afib and pt had a cardiac ablation with PA @ [facility]. PA showed "severe prolapse of posterior mitral leaflet with slight flail of P2 segment, severe torrential mitral regurgitation and moderate tricuspid regurg". Pt was referred to Dr. Burgess who advised pt to have surgical intervention for treatment. Pt c/o that he has bilateral lower extremity swelling which has been resolving since taking prescribed Lasix. Pt denies chest pain, palpitations, SOB, fatigue. (26 Aug 2019 12:15) 9/4 Patient is now post op from mitral clip x 1 via RCFV    PAST MEDICAL & SURGICAL HISTORY:  Mitral valve regurgitation  Afib  No significant past surgical history    FAMILY HISTORY:  FH: myocardial infarction: father      Vitals   ICU Vital Signs Last 24 Hrs  T(C): 36.8 (04 Sep 2019 22:00), Max: 36.8 (04 Sep 2019 17:15)  T(F): 98.2 (04 Sep 2019 22:00), Max: 98.2 (04 Sep 2019 17:15)  HR: 58 (05 Sep 2019 01:00) (52 - 62)  BP: 171/73 (04 Sep 2019 17:00) (126/60 - 171/73)  BP(mean): 105 (04 Sep 2019 17:00) (101 - 105)  ABP: 113/43 (05 Sep 2019 01:00) (107/40 - 170/72)  ABP(mean): 64 (05 Sep 2019 01:00) (62 - 110)  RR: 18 (05 Sep 2019 01:00) (11 - 29)  SpO2: 95% (05 Sep 2019 01:00) (94% - 100%)      VENT SETTINGS     ABG - ( 04 Sep 2019 13:25 )  pH, Arterial: 7.41  pH, Blood: x     /  pCO2: 40    /  pO2: 251   / HCO3: 25    / Base Excess: 0.7   /  SaO2: >100      I&O's Detail    04 Sep 2019 07:01  -  05 Sep 2019 01:51  --------------------------------------------------------  IN:    Oral Fluid: 240 mL    Solution: 50 mL    Solution: 50 mL  Total IN: 340 mL    OUT:    Indwelling Catheter - Urethral: 345 mL    Voided: 125 mL  Total OUT: 470 mL    Total NET: -130 mL          LABS                        12.6   3.24  )-----------( 172      ( 04 Sep 2019 13:46 )             39.1     09-04    139  |  106  |  24.0<H>  ----------------------------<  101  4.3   |  23.0  |  0.93    Ca    8.5<L>      04 Sep 2019 13:46  Phos  2.9     09-04  Mg     1.8     09-04    TPro  6.2<L>  /  Alb  3.5  /  TBili  0.7  /  DBili  0.2  /  AST  23  /  ALT  26  /  AlkPhos  56  09-04    PT/INR - ( 04 Sep 2019 13:46 )   PT: 14.4 sec;   INR: 1.24 ratio         PTT - ( 04 Sep 2019 13:46 )  PTT:36.6 sec  CARDIAC MARKERS ( 04 Sep 2019 13:46 )  CK57 U/L / CKMBx     / Troponin T<0.01 ng/mL /        MEDICATIONS  (STANDING):  amiodarone    Tablet 200 milliGRAM(s) Oral daily  aspirin  chewable 81 milliGRAM(s) Oral daily  cefuroxime  IVPB 1500 milliGRAM(s) IV Intermittent every 8 hours  furosemide    Tablet 20 milliGRAM(s) Oral daily  influenza   Vaccine 0.5 milliLiter(s) IntraMuscular once  metoprolol succinate  milliGRAM(s) Oral daily  pantoprazole    Tablet 40 milliGRAM(s) Oral before breakfast  sodium chloride 0.9% lock flush 3 milliLiter(s) IV Push every 8 hours    MEDICATIONS  (PRN):    Allergies:  penicillin (Rash)      Physical Exam:   Constitutional: NAD, well-groomed, well-developed  HEENT: PERRLA, EOMI, no drainage or redness  Neck: supple,  No JVD  Respiratory: Breath Sounds equal & clear bilaterally to auscultation, no rales/rhonchi/wheezing, no accessory muscle use noted  Cardiovascular: Regular rate, regular rhythm, normal S1, S2; no murmurs or rub  Gastrointestinal: Soft, non-tender, non distended, + bowel sounds  Extremities: TOVRA x 4, no peripheral edema, no cyanosis, no clubbing, right groin is c/d/i   Neurological: A+O x 3; speech clear and intact; no sensory, motor  deficits, normal reflexes  Skin: warm, dry, well perfused
No evidence of anesthetic complication.
No evidence of anesthetic complications.

## 2019-09-05 NOTE — DISCHARGE NOTE NURSING/CASE MANAGEMENT/SOCIAL WORK - PATIENT PORTAL LINK FT
You can access the FollowMyHealth Patient Portal offered by [facility] by registering at the following website: [email or web address]. By joining [employer]’s FollowMyHealth portal, you will also be able to view your health information using other applications (apps) compatible with our system.

## 2019-09-05 NOTE — DISCHARGE NOTE PROVIDER - NSDCFUADDAPPT_GEN_ALL_CORE_FT
Please follow up with Dr. Burgess on 9/10/19 at 4:30 PM.    The cardiac surgery office is on the second floor of [facility].   Take the [place] ("G") elevators to 2 and make a left.   Walk down to the second hallway on your left (before the double doors).   Sign says Cardiovascular and Thoracic Surgery.  The waiting room is on your left.    Please come to ED or Call Cardio thoracic office at [phone] if Chest pain, Shortness of Breath, persistant Nausea & vomiting, oozing from wounds, 2 lb increase in weight in 24 hours.

## 2019-09-05 NOTE — DISCHARGE NOTE PROVIDER - NSDCACTIVITY_GEN_ALL_CORE
No restrictions/Walking - Outdoors allowed/Do not drive or operate machinery/Walking - Indoors allowed/Showering allowed/Do not make important decisions/Stairs allowed/No heavy lifting/straining

## 2019-09-05 NOTE — DISCHARGE NOTE PROVIDER - CARE PROVIDER_API CALL
Fito Burgess)  Surgery; Thoracic and Cardiac Surgery  [address] [place] [address]  Phone: [phone]  Fax: [phone]  Follow Up Time:     Valentino, Patrick P (DO)  Cardiology; Internal Medicine  [address] [place] [address]  Phone: [phone]  Fax: [phone]  Follow Up Time:

## 2019-09-05 NOTE — DISCHARGE NOTE PROVIDER - NSDCCPTREATMENT_GEN_ALL_CORE_FT
PRINCIPAL PROCEDURE  Procedure: Repair, mitral valve, percutaneous, transcatheter, using 1 leaflet clip  Findings and Treatment: Percutaneous Mitral Valve Repair Utilizing the Mitral Clip via Right Common Femoral Vein (1 clip deployed) (NCT# [id number]) (STS/ACC TVT Registry Patient ID# [id number])

## 2019-09-05 NOTE — DISCHARGE NOTE NURSING/CASE MANAGEMENT/SOCIAL WORK - NSDCFUADDAPPT_GEN_ALL_CORE_FT
Please follow up with Dr. Burgess on 9/10/19 at 4:30 PM.    The cardiac surgery office is on the second floor of [facility].   Take the [place] ("G") elevators to 2 and make a left.   Walk down to the second hallway on your left (before the double doors).   Sign says Cardiovascular and Thoracic Surgery.  The waiting room is on your left.    Please come to ED or Call Cardio thoracic office at [phone] if Chest pain, Shortness of Breath, persistant Nausea & vomiting, oozing from wounds, 2 lb increase in weight in 24 hours.

## 2019-09-05 NOTE — DISCHARGE NOTE PROVIDER - NSDCFUADDINST_GEN_ALL_CORE_FT
- Keep the groin site clean and dry.  You may shower and pat he site dry.  - Watch the site for signs of redness or drainage, these should be reported to your doctor immediately.  - You will receive a wallet card about your new valve in the mail.  Please carry it with you to present to anyone who may ask if you have any medical implants.  - Be sure to inform your doctors including your dentist about your valve since you will need to take antibiotics to reduce the risk of infection before certain medical and dental procedures.  - You will be given an appointment to follow up with your doctor in approximately 4 weeks.  It is important to keep this appointment so that your new valve can be assessed.  - You may resume all you normal activities.

## 2019-09-05 NOTE — DISCHARGE NOTE PROVIDER - CARE PROVIDERS DIRECT ADDRESSES
[email or web address]

## 2019-09-05 NOTE — DISCHARGE NOTE PROVIDER - NSDCFUSCHEDAPPT_GEN_ALL_CORE_FT
TASH BEAUCHAMP ; 09/17/2019 ; NPP CT Surg [address]   TASH BEAUCHAMP ; 09/26/2019 ; [facility] Cardio [address]

## 2019-09-05 NOTE — DISCHARGE NOTE PROVIDER - [id number]_FT
PRINCIPAL DISCHARGE DIAGNOSIS  Diagnosis: Severe mitral regurgitation  Assessment and Plan of Treatment: To remain symtom free or reduction of symptoms      SECONDARY DISCHARGE DIAGNOSES  Diagnosis: PAF (paroxysmal atrial fibrillation)  Assessment and Plan of Treatment: Restart eliquis on 9/6/19    Diagnosis: Chronic diastolic heart failure  Assessment and Plan of Treatment: To remain symtom free or reduction of symptoms

## 2019-09-06 RX ORDER — APIXABAN 2.5 MG/1
1 TABLET, FILM COATED ORAL
Qty: 0 | Refills: 0 | DISCHARGE
Start: 2019-09-06

## 2019-09-17 ENCOUNTER — APPOINTMENT (OUTPATIENT)
Dept: CARDIOTHORACIC SURGERY | Facility: CLINIC | Age: 84
End: 2019-09-17

## 2019-09-17 VITALS
RESPIRATION RATE: 16 BRPM | DIASTOLIC BLOOD PRESSURE: 75 MMHG | SYSTOLIC BLOOD PRESSURE: 128 MMHG | OXYGEN SATURATION: 97 % | HEART RATE: 61 BPM

## 2019-09-17 RX ORDER — GLUCOSAMINE/MSM/CHONDROIT SULF 500-166.6
10 TABLET ORAL AS DIRECTED
Refills: 0 | Status: COMPLETED | COMMUNITY
End: 2019-09-17

## 2019-09-18 ENCOUNTER — APPOINTMENT (OUTPATIENT)
Dept: CARDIOLOGY | Facility: CLINIC | Age: 84
End: 2019-09-18
Payer: MEDICARE

## 2019-09-18 VITALS
BODY MASS INDEX: 29.51 KG/M2 | DIASTOLIC BLOOD PRESSURE: 60 MMHG | HEIGHT: 67 IN | SYSTOLIC BLOOD PRESSURE: 124 MMHG | OXYGEN SATURATION: 98 % | HEART RATE: 58 BPM | WEIGHT: 188 LBS

## 2019-09-18 PROCEDURE — 99214 OFFICE O/P EST MOD 30 MIN: CPT

## 2019-09-18 RX ORDER — METOPROLOL SUCCINATE 50 MG/1
50 TABLET, EXTENDED RELEASE ORAL DAILY
Refills: 0 | Status: DISCONTINUED | COMMUNITY
Start: 1900-01-01 | End: 2019-09-18

## 2019-09-18 RX ORDER — FUROSEMIDE 20 MG/1
20 TABLET ORAL
Qty: 90 | Refills: 1 | Status: DISCONTINUED | COMMUNITY
Start: 2019-08-15 | End: 2019-09-18

## 2019-09-18 NOTE — REASON FOR VISIT
[Consultation] : a consultation regarding [FreeTextEntry2] : Mitral Clip 9/4/19 Dr Elliott for severe MR, HFpEF, BENJY [FreeTextEntry1] : Jeanmarie is a 84-year-old male with history of hypertension, asthma, severe MR, normal LVEF, recurrent rapid AF on Eliquis, amiodarone, Toprol, Mitral Clip 9/4/19 Dr Elliott, postop echo LVEF 50-55 trace MR . \par \par Cardiovascular review of symptoms is negative for exertional chest pain, dyspnea, palpitations, dizziness or syncope.  No PND or orthopnea leg edema.  No bleeding or black stool.  Leg and scrotal edema has resolved since Mitral Clip surgery.  Lasix will be discontinued at this time. \par \par Cardiovascular review of symptoms is negative for exertional chest pain, dyspnea, palpitations, dizziness or syncope.  No PND or orthopnea leg edema.  No bleeding or black stool.\par \par Patient had rapid AF in office 7/26/19 given intravenous Lopressor 5 mg x1 and Lopressor 25 mg orally with improvement in AF rate below 100 bpm, stable blood pressure 125/70\par \par Patient had office evaluation PCP 7/16/19 recurrent chest pain abdominal pain found to be in new-onset rapid atrial fibrillation.  Patient started on Bystolic and Eliquis.  EKG 7/16/19 rapid AF 136bpm, RBBB, NSST.  EKG 7/17/19 sinus rhythm 66 bpm RBBB, LAFB, bifiaicular block\par \par Patient had recurrent atypical chest pain and abdominal pain symptoms.  Contrast CT abdomen negative for aneurysm. 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 Elective DC cardioversion performed patient started on amiodarone 200mg daily \par

## 2019-09-18 NOTE — PHYSICAL EXAM
[General Appearance - Well Developed] : well developed [Well Groomed] : well groomed [Normal Appearance] : normal appearance [No Deformities] : no deformities [General Appearance - Well Nourished] : well nourished [General Appearance - In No Acute Distress] : no acute distress [Eyelids - No Xanthelasma] : the eyelids demonstrated no xanthelasmas [Normal Conjunctiva] : the conjunctiva exhibited no abnormalities [Normal Oral Mucosa] : normal oral mucosa [No Oral Pallor] : no oral pallor [No Oral Cyanosis] : no oral cyanosis [Normal Jugular Venous A Waves Present] : normal jugular venous A waves present [No Jugular Venous Garcia A Waves] : no jugular venous garcia A waves [Normal Jugular Venous V Waves Present] : normal jugular venous V waves present [Respiration, Rhythm And Depth] : normal respiratory rhythm and effort [Exaggerated Use Of Accessory Muscles For Inspiration] : no accessory muscle use [Auscultation Breath Sounds / Voice Sounds] : lungs were clear to auscultation bilaterally [Heart Rate And Rhythm] : heart rate and rhythm were normal [Murmurs] : no murmurs present [Abdomen Soft] : soft [Abdomen Tenderness] : non-tender [Abdomen Mass (___ Cm)] : no abdominal mass palpated [Abnormal Walk] : normal gait [Gait - Sufficient For Exercise Testing] : the gait was sufficient for exercise testing [Nail Clubbing] : no clubbing of the fingernails [Cyanosis, Localized] : no localized cyanosis [Petechial Hemorrhages (___cm)] : no petechial hemorrhages [Skin Color & Pigmentation] : normal skin color and pigmentation [] : no rash [No Venous Stasis] : no venous stasis [Skin Lesions] : no skin lesions [No Skin Ulcers] : no skin ulcer [No Xanthoma] : no  xanthoma was observed [Oriented To Time, Place, And Person] : oriented to person, place, and time [Affect] : the affect was normal [Mood] : the mood was normal [No Anxiety] : not feeling anxious [FreeTextEntry1] : irreg 3/6 MARYLIN MR

## 2019-09-18 NOTE — DISCUSSION/SUMMARY
[FreeTextEntry1] : Jeanmarie is 84-year-old male with medical history detailed above and active medical issues including:\par \par - Mitral Clip 9/4/19 Dr Elliott, postop echo LVEF 50-55 trace MR . Continue aspirin 6 months to allow endothelialization of mitral clip. Followup with Dr Elliott 10/1/19. \par \par - HFpEF, edema resolved after Mitral Clip.  Stop Lasix for now.\par \par - Multiple evaluations with BENJY, currently rate controlled on Toprol and amiodarone, continue anticoagulation with Eliquis, rate control with Toprol and rhythm control with amiodarone. If patient remains in sinus rhythm next office visit one month will discontinue amiodarone. \par \par - Hypertension at BP goal less than 130/80\par \par - History of asthma\par \par Patient educated on lifestyle and diet modification with low sodium low fat diet and avoidance of excessive alcohol. Patient is aware to call with any symptoms or concerns.  Recommended increased oral hydration with electrolyte suppliment drinks, avoid caffeine and alcohol intake. Limited lifting <50lbs working as a . \par \par Patient will be seen in cardiology followup 1 month.  Patient will monitor home heart rate and blood pressures and daily weight.\par \par Followup with Dr Gurpreet Paulino for primary care. \par

## 2019-10-01 ENCOUNTER — OUTPATIENT (OUTPATIENT)
Dept: OUTPATIENT SERVICES | Facility: HOSPITAL | Age: 84
LOS: 1 days | End: 2019-10-01
Payer: MEDICARE

## 2019-10-01 ENCOUNTER — APPOINTMENT (OUTPATIENT)
Dept: CARDIOTHORACIC SURGERY | Facility: CLINIC | Age: 84
End: 2019-10-01
Payer: MEDICARE

## 2019-10-01 VITALS
TEMPERATURE: 97 F | SYSTOLIC BLOOD PRESSURE: 146 MMHG | OXYGEN SATURATION: 100 % | RESPIRATION RATE: 16 BRPM | BODY MASS INDEX: 30.13 KG/M2 | HEART RATE: 51 BPM | WEIGHT: 192 LBS | HEIGHT: 67 IN | DIASTOLIC BLOOD PRESSURE: 71 MMHG

## 2019-10-01 DIAGNOSIS — I34.0 NONRHEUMATIC MITRAL (VALVE) INSUFFICIENCY: ICD-10-CM

## 2019-10-01 PROCEDURE — 99024 POSTOP FOLLOW-UP VISIT: CPT

## 2019-10-01 PROCEDURE — 93306 TTE W/DOPPLER COMPLETE: CPT

## 2019-10-01 PROCEDURE — 93308 TTE F-UP OR LMTD: CPT | Mod: 26

## 2019-10-02 PROCEDURE — 82803 BLOOD GASES ANY COMBINATION: CPT

## 2019-10-02 PROCEDURE — 71045 X-RAY EXAM CHEST 1 VIEW: CPT

## 2019-10-02 PROCEDURE — 85730 THROMBOPLASTIN TIME PARTIAL: CPT

## 2019-10-02 PROCEDURE — 82330 ASSAY OF CALCIUM: CPT

## 2019-10-02 PROCEDURE — 82435 ASSAY OF BLOOD CHLORIDE: CPT

## 2019-10-02 PROCEDURE — 84295 ASSAY OF SERUM SODIUM: CPT

## 2019-10-02 PROCEDURE — 82550 ASSAY OF CK (CPK): CPT

## 2019-10-02 PROCEDURE — 84484 ASSAY OF TROPONIN QUANT: CPT

## 2019-10-02 PROCEDURE — C1769: CPT

## 2019-10-02 PROCEDURE — 83735 ASSAY OF MAGNESIUM: CPT

## 2019-10-02 PROCEDURE — 93325 DOPPLER ECHO COLOR FLOW MAPG: CPT

## 2019-10-02 PROCEDURE — 80048 BASIC METABOLIC PNL TOTAL CA: CPT

## 2019-10-02 PROCEDURE — C1894: CPT

## 2019-10-02 PROCEDURE — 84132 ASSAY OF SERUM POTASSIUM: CPT

## 2019-10-02 PROCEDURE — 93005 ELECTROCARDIOGRAM TRACING: CPT

## 2019-10-02 PROCEDURE — 85014 HEMATOCRIT: CPT

## 2019-10-02 PROCEDURE — C1893: CPT

## 2019-10-02 PROCEDURE — 36415 COLL VENOUS BLD VENIPUNCTURE: CPT

## 2019-10-02 PROCEDURE — 93312 ECHO TRANSESOPHAGEAL: CPT

## 2019-10-02 PROCEDURE — 93306 TTE W/DOPPLER COMPLETE: CPT

## 2019-10-02 PROCEDURE — 86923 COMPATIBILITY TEST ELECTRIC: CPT

## 2019-10-02 PROCEDURE — 85027 COMPLETE CBC AUTOMATED: CPT

## 2019-10-02 PROCEDURE — 83605 ASSAY OF LACTIC ACID: CPT

## 2019-10-02 PROCEDURE — 84100 ASSAY OF PHOSPHORUS: CPT

## 2019-10-02 PROCEDURE — 76000 FLUOROSCOPY <1 HR PHYS/QHP: CPT

## 2019-10-02 PROCEDURE — 80076 HEPATIC FUNCTION PANEL: CPT

## 2019-10-02 PROCEDURE — C1889: CPT

## 2019-10-02 PROCEDURE — C1887: CPT

## 2019-10-02 PROCEDURE — 82947 ASSAY GLUCOSE BLOOD QUANT: CPT

## 2019-10-02 PROCEDURE — 93320 DOPPLER ECHO COMPLETE: CPT

## 2019-10-02 PROCEDURE — 85610 PROTHROMBIN TIME: CPT

## 2019-10-10 ENCOUNTER — APPOINTMENT (OUTPATIENT)
Dept: CARDIOLOGY | Facility: CLINIC | Age: 84
End: 2019-10-10
Payer: MEDICARE

## 2019-10-10 ENCOUNTER — NON-APPOINTMENT (OUTPATIENT)
Age: 84
End: 2019-10-10

## 2019-10-10 VITALS
BODY MASS INDEX: 27.8 KG/M2 | OXYGEN SATURATION: 96 % | DIASTOLIC BLOOD PRESSURE: 88 MMHG | WEIGHT: 192 LBS | HEIGHT: 69.5 IN | SYSTOLIC BLOOD PRESSURE: 122 MMHG | HEART RATE: 52 BPM

## 2019-10-10 PROCEDURE — 99214 OFFICE O/P EST MOD 30 MIN: CPT

## 2019-10-10 PROCEDURE — 93000 ELECTROCARDIOGRAM COMPLETE: CPT

## 2019-10-10 NOTE — DISCUSSION/SUMMARY
[FreeTextEntry1] : Jeanmarie is 84-year-old male with medical history detailed above and active medical issues including:\par \par \par \par - Patient has sinus bradycardia, stable blood pressure, amiodarone reduced from 200 mg daily to 100 mg daily.  Patient will office visit with EKG one month with the plan of discontinuing amiodarone.  Will discuss the need for continued aspirin after mitral clip with Dr Elliott.  Multiple evaluations with BENJY, currently rate controlled on Toprol and amiodarone,  Eliquis. \par \par - Mitral Clip 9/4/19 Dr Elliott, postop echo LVEF 50-55 trace MR . Continue aspirin 6 months to allow endothelialization of mitral clip. Followup with Dr Elliott 10/1/19. \par \par - HFpEF, edema resolved after Mitral Clip.  Stop Lasix for now.\par \par -  Patient has erectile dysfunction started on Viagra 25 mg daily as needed ED\par \par - Hypertension at BP goal less than 130/80\par \par - History of asthma\par \par Patient educated on lifestyle and diet modification with low sodium low fat diet and avoidance of excessive alcohol. Patient is aware to call with any symptoms or concerns.  Recommended increased oral hydration with electrolyte suppliment drinks, avoid caffeine and alcohol intake. Limited lifting <50lbs working as a . \par \par Patient will be seen in cardiology followup 1 month.  Patient will monitor home heart rate and blood pressures and daily weight.\par \par Followup with Dr Gurpreet Paulino for primary care. \par

## 2019-10-10 NOTE — REVIEW OF SYSTEMS
[Negative] : Endocrine [Chest Pain] : no chest pain [Dyspnea on exertion] : not dyspnea during exertion [Chest  Pressure] : no chest pressure [Lower Ext Edema] : no extremity edema [Abdominal Pain] : no abdominal pain [FreeTextEntry1] : progressive scrotal edema

## 2019-10-10 NOTE — REASON FOR VISIT
[Consultation] : a consultation regarding [FreeTextEntry2] : Mitral Clip 9/4/19 Dr Elliott for severe MR, HFpEF, BENJY [FreeTextEntry1] : Jeanmarie is a 84-year-old male with history of hypertension, asthma, severe MR, normal LVEF, recurrent rapid AF on Eliquis, amiodarone, Toprol, Mitral Clip 9/4/19 Dr Elliott, postop echo LVEF 50-55 trace MR .\par \par Patient has erectile dysfunction started on Viagra 25 mg daily as needed ED\par \par Patient has sinus bradycardia, stable blood pressure, amiodarone reduced from 200 mg daily to 100 mg daily.  Patient will office visit with EKG one month with the plan of discontinuing amiodarone.  Will discuss the need for continued aspirin after mitral clip with Dr Elliott \par \par Cardiovascular review of symptoms is negative for exertional chest pain, dyspnea, palpitations, dizziness or syncope.  No PND or orthopnea leg edema.  No bleeding or black stool.  Leg and scrotal edema has resolved since Mitral Clip surgery.  Lasix will be discontinued at this time. \par \par Cardiovascular review of symptoms is negative for exertional chest pain, dyspnea, palpitations, dizziness or syncope.  No PND or orthopnea leg edema.  No bleeding or black stool.\par \par Patient had rapid AF in office 7/26/19 given intravenous Lopressor 5 mg x1 and Lopressor 25 mg orally with improvement in AF rate below 100 bpm, stable blood pressure 125/70\par \par Patient had office evaluation PCP 7/16/19 recurrent chest pain abdominal pain found to be in new-onset rapid atrial fibrillation.  Patient started on Bystolic and Eliquis.  EKG 7/16/19 rapid AF 136bpm, RBBB, NSST.  EKG 7/17/19 sinus rhythm 66 bpm RBBB, LAFB, bifiaicular block\par \par Patient had recurrent atypical chest pain and abdominal pain symptoms.  Contrast CT abdomen negative for aneurysm. 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 Elective DC cardioversion performed patient started on amiodarone 200mg daily \par

## 2019-10-10 NOTE — PHYSICAL EXAM
[Normal Appearance] : normal appearance [General Appearance - Well Developed] : well developed [General Appearance - Well Nourished] : well nourished [Well Groomed] : well groomed [No Deformities] : no deformities [Normal Conjunctiva] : the conjunctiva exhibited no abnormalities [General Appearance - In No Acute Distress] : no acute distress [Eyelids - No Xanthelasma] : the eyelids demonstrated no xanthelasmas [Normal Oral Mucosa] : normal oral mucosa [No Oral Pallor] : no oral pallor [No Oral Cyanosis] : no oral cyanosis [Normal Jugular Venous A Waves Present] : normal jugular venous A waves present [Normal Jugular Venous V Waves Present] : normal jugular venous V waves present [No Jugular Venous Garcia A Waves] : no jugular venous garcia A waves [Respiration, Rhythm And Depth] : normal respiratory rhythm and effort [Exaggerated Use Of Accessory Muscles For Inspiration] : no accessory muscle use [Auscultation Breath Sounds / Voice Sounds] : lungs were clear to auscultation bilaterally [Heart Rate And Rhythm] : heart rate and rhythm were normal [Murmurs] : no murmurs present [Abdomen Soft] : soft [Abdomen Tenderness] : non-tender [Abdomen Mass (___ Cm)] : no abdominal mass palpated [Abnormal Walk] : normal gait [Gait - Sufficient For Exercise Testing] : the gait was sufficient for exercise testing [Nail Clubbing] : no clubbing of the fingernails [Cyanosis, Localized] : no localized cyanosis [Petechial Hemorrhages (___cm)] : no petechial hemorrhages [Skin Color & Pigmentation] : normal skin color and pigmentation [] : no rash [No Venous Stasis] : no venous stasis [Skin Lesions] : no skin lesions [No Skin Ulcers] : no skin ulcer [No Xanthoma] : no  xanthoma was observed [Oriented To Time, Place, And Person] : oriented to person, place, and time [Affect] : the affect was normal [Mood] : the mood was normal [No Anxiety] : not feeling anxious [FreeTextEntry1] : irreg 3/6 MARY[place] MR

## 2019-10-15 ENCOUNTER — RX CHANGE (OUTPATIENT)
Age: 84
End: 2019-10-15

## 2019-10-15 RX ORDER — SILDENAFIL 50 MG/1
50 TABLET ORAL
Qty: 7 | Refills: 3 | Status: DISCONTINUED | COMMUNITY
Start: 2019-10-10 | End: 2019-10-15

## 2019-10-15 RX ORDER — SILDENAFIL 20 MG/1
20 TABLET ORAL
Qty: 30 | Refills: 5 | Status: ACTIVE | COMMUNITY
Start: 2019-10-15 | End: 1900-01-01

## 2019-10-31 ENCOUNTER — APPOINTMENT (OUTPATIENT)
Dept: CARDIOLOGY | Facility: CLINIC | Age: 84
End: 2019-10-31
Payer: MEDICARE

## 2019-10-31 ENCOUNTER — RX RENEWAL (OUTPATIENT)
Age: 84
End: 2019-10-31

## 2019-10-31 VITALS
WEIGHT: 198 LBS | OXYGEN SATURATION: 99 % | SYSTOLIC BLOOD PRESSURE: 122 MMHG | HEIGHT: 67.5 IN | BODY MASS INDEX: 30.71 KG/M2 | HEART RATE: 53 BPM | DIASTOLIC BLOOD PRESSURE: 80 MMHG

## 2019-10-31 PROCEDURE — 99214 OFFICE O/P EST MOD 30 MIN: CPT

## 2019-10-31 NOTE — DISCUSSION/SUMMARY
[FreeTextEntry1] : Jeanmarie is 84-year-old male with medical history detailed above and active medical issues including:\par \par - Patient has sinus bradycardia, stable blood pressure, amiodarone 100 mg daily will be discontinued Jan 2020, 3 months postop.   \par \par - Multiple evaluations with BENJY, currently rate controlled NSR continue long term Toprol and  Eliquis stop amiodarone and aspirin Jan 2020. \par \par - Mitral Clip 9/4/19 Dr Elliott, postop echo LVEF 50-55 trace MR . Continue aspirin 3 months postop  to allow endothelialization of mitral clip as per  Dr Elliott  \par \par - HFpEF, edema resolved after Mitral Clip.  Off Lasix postop.\par \par -  Patient has erectile dysfunction on Viagra 25 mg daily as needed ED\par \par - Hypertension at BP goal less than 130/80\par \par - History of asthma\par \par Patient educated on lifestyle and diet modification with low sodium low fat diet and avoidance of excessive alcohol. Patient is aware to call with any symptoms or concerns.  Recommended increased oral hydration with electrolyte suppliment drinks, avoid caffeine and alcohol intake. Limited lifting <50lbs working as a . \par \par Patient will be seen in cardiology followup 6 month when he returns from [place].  Patient will monitor home heart rate and blood pressures and daily weight.\par \par Followup with Dr Gurpreet Paulino for primary care. \par

## 2019-10-31 NOTE — REVIEW OF SYSTEMS
[Negative] : Heme/Lymph [Dyspnea on exertion] : not dyspnea during exertion [Chest  Pressure] : no chest pressure [Chest Pain] : no chest pain [Lower Ext Edema] : no extremity edema [Abdominal Pain] : no abdominal pain [FreeTextEntry1] : progressive scrotal edema

## 2019-10-31 NOTE — REASON FOR VISIT
[Consultation] : a consultation regarding [FreeTextEntry2] : Mitral Clip 9/4/19 Dr Elliott for severe MR, HFpEF, BENJY [FreeTextEntry1] : Jeanmarie is a 84-year-old male with history of hypertension, asthma, severe MR, normal LVEF, recurrent rapid AF on Eliquis, amiodarone maintaining NSR, Toprol, Mitral Clip 9/4/19 Dr Elliott, postop echo LVEF 50-55% trace MR .\par \par Patient has erectile dysfunction started on Viagra 25 mg daily as needed ED\par \par Patient has sinus bradycardia, stable blood pressure, amiodarone reduced from 200 mg daily to 100 mg daily.  Patient will  discontinue amiodarone Jan 2020,  3 months postop.  No need to continue long term aspirin  after mitral clip discussed with Dr Elliott \par \par Cardiovascular review of symptoms is negative for exertional chest pain, dyspnea, palpitations, dizziness or syncope.  No PND or orthopnea leg edema.  No bleeding or black stool.  Leg and scrotal edema has resolved since Mitral Clip surgery.  Lasix will be discontinued at this time. \par \par Cardiovascular review of symptoms is negative for exertional chest pain, dyspnea, palpitations, dizziness or syncope.  No PND or orthopnea leg edema.  No bleeding or black stool.\par \par Patient had rapid AF in office 7/26/19 given intravenous Lopressor 5 mg x1 and Lopressor 25 mg orally with improvement in AF rate below 100 bpm, stable blood pressure 125/70\par \par Patient had office evaluation PCP 7/16/19 recurrent chest pain abdominal pain found to be in new-onset rapid atrial fibrillation.  Patient started on Bystolic and Eliquis.  EKG 7/16/19 rapid AF 136bpm, RBBB, NSST.  EKG 7/17/19 sinus rhythm 66 bpm RBBB, LAFB, bifiaicular block\par \par Patient had recurrent atypical chest pain and abdominal pain symptoms.  Contrast CT abdomen negative for aneurysm. 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 Elective DC cardioversion performed patient started on amiodarone 200mg daily \par

## 2019-11-05 ENCOUNTER — RX RENEWAL (OUTPATIENT)
Age: 84
End: 2019-11-05

## 2019-12-10 RX ORDER — AMIODARONE HYDROCHLORIDE 100 MG/1
100 TABLET ORAL DAILY
Qty: 30 | Refills: 0 | Status: DISCONTINUED | COMMUNITY
End: 2019-12-10

## 2019-12-13 ENCOUNTER — MEDICATION RENEWAL (OUTPATIENT)
Age: 84
End: 2019-12-13

## 2019-12-17 ENCOUNTER — RECORD ABSTRACTING (OUTPATIENT)
Age: 84
End: 2019-12-17

## 2019-12-19 ENCOUNTER — APPOINTMENT (OUTPATIENT)
Dept: CARDIOLOGY | Facility: CLINIC | Age: 84
End: 2019-12-19
Payer: MEDICARE

## 2019-12-19 ENCOUNTER — NON-APPOINTMENT (OUTPATIENT)
Age: 84
End: 2019-12-19

## 2019-12-19 VITALS
OXYGEN SATURATION: 94 % | RESPIRATION RATE: 16 BRPM | BODY MASS INDEX: 31.02 KG/M2 | DIASTOLIC BLOOD PRESSURE: 92 MMHG | SYSTOLIC BLOOD PRESSURE: 142 MMHG | HEIGHT: 67.5 IN | WEIGHT: 200 LBS | HEART RATE: 79 BPM

## 2019-12-19 PROCEDURE — 99214 OFFICE O/P EST MOD 30 MIN: CPT

## 2019-12-19 NOTE — DISCUSSION/SUMMARY
[FreeTextEntry1] : Jeanmarie is 84-year-old male with medical history detailed above and active medical issues including:\par \par - Patient has sinus bradycardia, stable blood pressure, amiodarone 100 mg daily will be discontinued Jan 2020, 3 months postop.   \par \par - Multiple evaluations with BENJY, currently rate controlled NSR continue long term Toprol and  Eliquis stop amiodarone and aspirin Jan 2020. \par \par - Mitral Clip 9/4/19 Dr Elliott, postop echo LVEF 50-55 trace MR . Continue aspirin 3 months postop  to allow endothelialization of mitral clip as per  Dr Elliott  \par \par - HFpEF, edema resolved after Mitral Clip.  Off Lasix postop.\par \par -  Patient has erectile dysfunction on Viagra 25 mg daily as needed ED\par \par - Hypertension at BP goal less than 130/80\par \par - History of asthma\par \par Patient educated on lifestyle and diet modification with low sodium low fat diet and avoidance of excessive alcohol. Patient is aware to call with any symptoms or concerns.  Recommended increased oral hydration with electrolyte suppliment drinks, avoid caffeine and alcohol intake. Limited lifting <50lbs working as a . \par \par FU after Holter \par

## 2019-12-19 NOTE — REVIEW OF SYSTEMS
[Negative] : Heme/Lymph [Dyspnea on exertion] : not dyspnea during exertion [Chest Pain] : no chest pain [Chest  Pressure] : no chest pressure [Abdominal Pain] : no abdominal pain [Lower Ext Edema] : no extremity edema [FreeTextEntry1] : progressive scrotal edema

## 2019-12-19 NOTE — REASON FOR VISIT
[Follow-Up - Clinic] : a clinic follow-up of [FreeTextEntry2] : Mitral Clip 9/4/19 Dr Elliott for severe MR, HFpEF, BENJY [FreeTextEntry1] : Jeanmarie is a 84-year-old male with history of hypertension, asthma, severe MR, normal LVEF, recurrent rapid AF on Eliquis, amiodarone maintaining NSR, Toprol, Mitral Clip 9/4/19 Dr Elliott, postop echo LVEF 50-55% trace MR .\par \par Patient has erectile dysfunction started on Viagra 25 mg daily as needed ED\par \par Patient has sinus bradycardia, stable blood pressure, amiodarone reduced from 200 mg daily to 100 mg daily.  Patient will  discontinue amiodarone Jan 2020,  3 months postop. \par Cardiovascular review of symptoms is negative for exertional chest pain, dyspnea, palpitations, dizziness or syncope.  No PND or orthopnea leg edema.  No bleeding or black stool.  Leg and scrotal edema has resolved since Mitral Clip surgery.  Lasix will be discontinued at this time. \par \par Cardiovascular review of symptoms is negative for exertional chest pain, dyspnea, palpitations, dizziness or syncope.  No PND or orthopnea leg edema.  No bleeding or black stool.\par \par Patient had rapid AF in office 7/26/19 given intravenous Lopressor 5 mg x1 and Lopressor 25 mg orally with improvement in AF rate below 100 bpm, stable blood pressure 125/70\par \par Patient had office evaluation PCP 7/16/19 recurrent chest pain abdominal pain found to be in new-onset rapid atrial fibrillation.  Patient started on Bystolic and Eliquis.  EKG 7/16/19 rapid AF 136bpm, RBBB, NSST.  EKG 7/17/19 sinus rhythm 66 bpm RBBB, LAFB, bifiaicular block\par \par Patient had recurrent atypical chest pain and abdominal pain symptoms.  Contrast CT abdomen negative for aneurysm. 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 Elective DC cardioversion performed patient started on amiodarone 200mg daily \par Patient is in atrial fibrillation. He is on today's ECG rate is controlled. We will ultimately to 25 respiration rate control. We will discontinue anticoagulation or amiodarone at this point. Consider elective ablation if remains in atrial fibrillation.\par

## 2019-12-19 NOTE — PHYSICAL EXAM
[Normal Appearance] : normal appearance [General Appearance - Well Nourished] : well nourished [General Appearance - Well Developed] : well developed [Well Groomed] : well groomed [No Deformities] : no deformities [General Appearance - In No Acute Distress] : no acute distress [Normal Oral Mucosa] : normal oral mucosa [Eyelids - No Xanthelasma] : the eyelids demonstrated no xanthelasmas [Normal Conjunctiva] : the conjunctiva exhibited no abnormalities [Normal Jugular Venous A Waves Present] : normal jugular venous A waves present [No Oral Pallor] : no oral pallor [No Oral Cyanosis] : no oral cyanosis [Normal Jugular Venous V Waves Present] : normal jugular venous V waves present [No Jugular Venous Garcia A Waves] : no jugular venous garcia A waves [Respiration, Rhythm And Depth] : normal respiratory rhythm and effort [Auscultation Breath Sounds / Voice Sounds] : lungs were clear to auscultation bilaterally [Exaggerated Use Of Accessory Muscles For Inspiration] : no accessory muscle use [Heart Rate And Rhythm] : heart rate and rhythm were normal [Murmurs] : no murmurs present [Abdomen Soft] : soft [Abdomen Tenderness] : non-tender [Abnormal Walk] : normal gait [Abdomen Mass (___ Cm)] : no abdominal mass palpated [Gait - Sufficient For Exercise Testing] : the gait was sufficient for exercise testing [Nail Clubbing] : no clubbing of the fingernails [Petechial Hemorrhages (___cm)] : no petechial hemorrhages [Cyanosis, Localized] : no localized cyanosis [] : no rash [Skin Color & Pigmentation] : normal skin color and pigmentation [No Venous Stasis] : no venous stasis [Skin Lesions] : no skin lesions [No Xanthoma] : no  xanthoma was observed [No Skin Ulcers] : no skin ulcer [Affect] : the affect was normal [No Anxiety] : not feeling anxious [Mood] : the mood was normal [Oriented To Time, Place, And Person] : oriented to person, place, and time [FreeTextEntry1] : irreg 3/6 MARYLIN MR

## 2019-12-26 ENCOUNTER — APPOINTMENT (OUTPATIENT)
Dept: CARDIOLOGY | Facility: CLINIC | Age: 84
End: 2019-12-26
Payer: MEDICARE

## 2019-12-26 VITALS
WEIGHT: 200 LBS | SYSTOLIC BLOOD PRESSURE: 150 MMHG | OXYGEN SATURATION: 99 % | BODY MASS INDEX: 31.02 KG/M2 | HEIGHT: 67.5 IN | DIASTOLIC BLOOD PRESSURE: 84 MMHG | HEART RATE: 69 BPM

## 2019-12-26 PROCEDURE — 99214 OFFICE O/P EST MOD 30 MIN: CPT

## 2019-12-26 PROCEDURE — 93224 XTRNL ECG REC UP TO 48 HRS: CPT

## 2019-12-26 RX ORDER — AMIODARONE HYDROCHLORIDE 200 MG/1
200 TABLET ORAL DAILY
Qty: 90 | Refills: 1 | Status: DISCONTINUED | COMMUNITY
Start: 2019-11-05 | End: 2019-12-26

## 2019-12-26 RX ORDER — ASPIRIN ENTERIC COATED TABLETS 81 MG 81 MG/1
81 TABLET, DELAYED RELEASE ORAL DAILY
Refills: 0 | Status: DISCONTINUED | COMMUNITY
End: 2019-12-26

## 2019-12-26 NOTE — DISCUSSION/SUMMARY
[FreeTextEntry1] : Jeanmarie is 84-year-old male with medical history detailed above and active medical issues including:\par \par - Patient had sinus bradycardia with stable blood pressure Oct 2019, amiodarone reduced to 100 mg daily with plan to discontinue Jan 2020, 3 months postop. Holter monitor done December 2019  AF flutter with controlled ventricular rate.  Discontinue low-dose amiodarone, continue Toprol, Eliquis. Patient wishes to wait until May 2020 when he returns from [place] to followup with electrophysiologist Dr Robin Gonzalez. If recurrent BENJY restart oral amiodarone. \par \par - Multiple office evaluations with BENJY in past. Patient is compliant with medication.  Patient is currently consuming moderate caffeine and alcohol. Recommended increased oral hydration with electrolyte suppliment drinks, avoid caffeine and alcohol intake.\par \par - Mitral Clip 9/4/19 Dr Elliott, postop echo LVEF 50-55 trace MR . Continue aspirin 3 months postop  to allow endothelialization of mitral clip as per  Dr Elliott  \par \par - HFpEF, edema resolved after Mitral Clip.  Off Lasix postop.\par \par -  Patient has erectile dysfunction on Viagra 25 mg daily as needed ED\par \par - Hypertension at BP goal less than 130/80\par \par - History of asthma\par \par Patient educated on lifestyle and diet modification with low sodium low fat diet and avoidance of excessive alcohol. Patient is aware to call with any symptoms or concerns.  Recommended increased oral hydration with electrolyte suppliment drinks, avoid caffeine and alcohol intake. Limited lifting <50lbs working as a . \par \par Patient will be seen in cardiology followup 6 month when he returns from [place].  Patient will have 2-D echocardiogram to assess for  LV systolic function, wall motion and  MV clip for MR. \par \par Patient will monitor home heart rate and blood pressures and daily weight.\par \par Followup with Dr Gurpreet Paulino for primary care. \par

## 2019-12-26 NOTE — REASON FOR VISIT
[Consultation] : a consultation regarding [FreeTextEntry2] : PAF on Eliquis, Toprol, amiodarone, Mitral Clip 9/4/19 Dr Elliott for severe MR, HFpEF [FreeTextEntry1] : Jeanmarie is a 84-year-old male with history of hypertension, asthma, severe MR, normal LVEF, recurrent rapid AF on Eliquis, amiodarone maintaining NSR, Toprol, Mitral Clip 9/4/19 Dr Elliott, postop echo LVEF 50-55% trace MR, erectile dysfunction on Viagra 25mg.\par \par Patient has fatigue.  One episode of atypical chest pain after ingesting a large meal. Cardiovascular review of symptoms is negative for exertional chest pain, dyspnea, palpitations, dizziness or syncope.  No PND or orthopnea leg edema.  No bleeding or black stool.  Leg and scrotal edema has resolved since Mitral Clip surgery.  Lasix discontinued Oct 2019.  Patient is compliant with medication.  Patient is currently consuming moderate caffeine and alcohol. Recommended increased oral hydration with electrolyte suppliment drinks, avoid caffeine and alcohol intake.\par \par Cardiovascular review of symptoms is negative for exertional chest pain, dyspnea, palpitations, dizziness or syncope.  No PND or orthopnea leg edema.  No bleeding or black stool.\par \par Patient had sinus bradycardia with stable blood pressure, amiodarone was reduced from 200 mg daily to 100 mg daily Oct 2019.  Patient will  discontinue amiodarone Jan 2020,  3 months postop.  No need to continue long term aspirin  after mitral clip discussed with Dr Elliott \par \par Patient had rapid AF in office 7/26/19 given intravenous Lopressor 5 mg x1 and Lopressor 25 mg orally with improvement in AF rate below 100 bpm, stable blood pressure 125/70\par \par Patient had office evaluation PCP 7/16/19 recurrent chest pain abdominal pain found to be in new-onset rapid atrial fibrillation.  Patient started on Bystolic and Eliquis.  EKG 7/16/19 rapid AF 136bpm, RBBB, NSST.  EKG 7/17/19 sinus rhythm 66 bpm RBBB, LAFB, bifiaicular block\par \par Patient had recurrent atypical chest pain and abdominal pain symptoms.  Contrast CT abdomen negative for aneurysm. 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 Elective DC cardioversion performed patient started on amiodarone 200mg daily \par

## 2019-12-26 NOTE — PHYSICAL EXAM
[General Appearance - Well Developed] : well developed [Normal Appearance] : normal appearance [Well Groomed] : well groomed [No Deformities] : no deformities [General Appearance - Well Nourished] : well nourished [General Appearance - In No Acute Distress] : no acute distress [Normal Conjunctiva] : the conjunctiva exhibited no abnormalities [Eyelids - No Xanthelasma] : the eyelids demonstrated no xanthelasmas [Normal Oral Mucosa] : normal oral mucosa [No Oral Pallor] : no oral pallor [No Oral Cyanosis] : no oral cyanosis [Normal Jugular Venous A Waves Present] : normal jugular venous A waves present [Normal Jugular Venous V Waves Present] : normal jugular venous V waves present [No Jugular Venous Garcia A Waves] : no jugular venous garcia A waves [Respiration, Rhythm And Depth] : normal respiratory rhythm and effort [Exaggerated Use Of Accessory Muscles For Inspiration] : no accessory muscle use [Auscultation Breath Sounds / Voice Sounds] : lungs were clear to auscultation bilaterally [Heart Rate And Rhythm] : heart rate and rhythm were normal [Murmurs] : no murmurs present [Abdomen Soft] : soft [Abdomen Tenderness] : non-tender [Abdomen Mass (___ Cm)] : no abdominal mass palpated [Abnormal Walk] : normal gait [Gait - Sufficient For Exercise Testing] : the gait was sufficient for exercise testing [Petechial Hemorrhages (___cm)] : no petechial hemorrhages [Nail Clubbing] : no clubbing of the fingernails [Cyanosis, Localized] : no localized cyanosis [Skin Color & Pigmentation] : normal skin color and pigmentation [] : no rash [Skin Lesions] : no skin lesions [No Skin Ulcers] : no skin ulcer [No Venous Stasis] : no venous stasis [No Xanthoma] : no  xanthoma was observed [Oriented To Time, Place, And Person] : oriented to person, place, and time [Affect] : the affect was normal [Mood] : the mood was normal [No Anxiety] : not feeling anxious [FreeTextEntry1] : irreg 3/6 MARYLIN MR

## 2019-12-26 NOTE — REVIEW OF SYSTEMS
[Negative] : Heme/Lymph [Chest  Pressure] : no chest pressure [Dyspnea on exertion] : not dyspnea during exertion [Chest Pain] : no chest pain [Lower Ext Edema] : no extremity edema [Abdominal Pain] : no abdominal pain [FreeTextEntry1] : progressive scrotal edema

## 2019-12-30 ENCOUNTER — MEDICATION RENEWAL (OUTPATIENT)
Age: 84
End: 2019-12-30

## 2020-01-20 ENCOUNTER — RX RENEWAL (OUTPATIENT)
Age: 85
End: 2020-01-20

## 2020-06-04 ENCOUNTER — APPOINTMENT (OUTPATIENT)
Dept: CARDIOLOGY | Facility: CLINIC | Age: 85
End: 2020-06-04
Payer: MEDICARE

## 2020-06-04 VITALS
OXYGEN SATURATION: 93 % | HEART RATE: 101 BPM | BODY MASS INDEX: 32.18 KG/M2 | HEIGHT: 67 IN | WEIGHT: 205 LBS | DIASTOLIC BLOOD PRESSURE: 80 MMHG | TEMPERATURE: 98.5 F | RESPIRATION RATE: 15 BRPM | SYSTOLIC BLOOD PRESSURE: 130 MMHG

## 2020-06-04 PROCEDURE — 99214 OFFICE O/P EST MOD 30 MIN: CPT

## 2020-06-04 NOTE — REVIEW OF SYSTEMS
[Negative] : Heme/Lymph [Chest  Pressure] : no chest pressure [Chest Pain] : no chest pain [Dyspnea on exertion] : not dyspnea during exertion [Abdominal Pain] : no abdominal pain [Lower Ext Edema] : no extremity edema [FreeTextEntry1] : progressive scrotal edema

## 2020-06-04 NOTE — DISCUSSION/SUMMARY
[FreeTextEntry1] : Jeanmarie is 85-year-old male with medical history detailed above and active medical issues including:\par \par - Asymptomatic chronic AF on Eliquis, Toprol, off amiodarone\par \par - Multiple office evaluations with BENJY prior to MV clip.\par \par - Mitral Clip 9/4/19 Dr Elliott, postop echo LVEF 50-55 trace MR . Continue aspirin 3 months postop  to allow endothelialization of mitral clip as per  Dr Elliott  \par \par - HFpEF, edema resolved after Mitral Clip.  Off Lasix postop.\par \par -  Patient has erectile dysfunction on Viagra 25 mg daily as needed ED\par \par - Hypertension at BP goal less than 130/80 on Toprol\par \par - History of asthma\par \par Patient educated on lifestyle and diet modification with low sodium low fat diet and avoidance of excessive alcohol. Patient is aware to call with any symptoms or concerns.  Recommended increased oral hydration with electrolyte suppliment drinks, avoid caffeine and alcohol intake. Limited lifting <50lbs working as a . \par \par Patient will be seen in cardiology followup 6 month when he returns from [place].  Patient had 2-D echocardiogram to assess for  LV systolic function, wall motion and  MV clip for MR. \par \par Patient will monitor home heart rate and blood pressures and daily weight.\par \par Followup with Dr Gurpreet Paulino for primary care. \par

## 2020-06-04 NOTE — PHYSICAL EXAM
[General Appearance - Well Developed] : well developed [General Appearance - Well Nourished] : well nourished [Well Groomed] : well groomed [Normal Appearance] : normal appearance [No Deformities] : no deformities [General Appearance - In No Acute Distress] : no acute distress [Normal Conjunctiva] : the conjunctiva exhibited no abnormalities [Eyelids - No Xanthelasma] : the eyelids demonstrated no xanthelasmas [No Oral Pallor] : no oral pallor [No Oral Cyanosis] : no oral cyanosis [Normal Oral Mucosa] : normal oral mucosa [Normal Jugular Venous V Waves Present] : normal jugular venous V waves present [Normal Jugular Venous A Waves Present] : normal jugular venous A waves present [No Jugular Venous Garcia A Waves] : no jugular venous garcia A waves [Respiration, Rhythm And Depth] : normal respiratory rhythm and effort [Exaggerated Use Of Accessory Muscles For Inspiration] : no accessory muscle use [Auscultation Breath Sounds / Voice Sounds] : lungs were clear to auscultation bilaterally [Abdomen Soft] : soft [Murmurs] : no murmurs present [Heart Rate And Rhythm] : heart rate and rhythm were normal [Abdomen Mass (___ Cm)] : no abdominal mass palpated [Abdomen Tenderness] : non-tender [Gait - Sufficient For Exercise Testing] : the gait was sufficient for exercise testing [Abnormal Walk] : normal gait [Nail Clubbing] : no clubbing of the fingernails [Cyanosis, Localized] : no localized cyanosis [Petechial Hemorrhages (___cm)] : no petechial hemorrhages [Skin Color & Pigmentation] : normal skin color and pigmentation [] : no rash [No Venous Stasis] : no venous stasis [Skin Lesions] : no skin lesions [No Skin Ulcers] : no skin ulcer [No Xanthoma] : no  xanthoma was observed [Oriented To Time, Place, And Person] : oriented to person, place, and time [Mood] : the mood was normal [Affect] : the affect was normal [No Anxiety] : not feeling anxious [FreeTextEntry1] : irreg 3/6 MARYLIN MR

## 2020-06-04 NOTE — REASON FOR VISIT
[Consultation] : a consultation regarding [FreeTextEntry1] : Jeanmarie is a 85-year-old male with history of hypertension, asthma, severe MR, normal LVEF, recurrent rapid AF on Eliquis, amiodarone maintaining NSR, Toprol, Mitral Clip 9/4/19 Dr Elliott, postop echo LVEF 50-55% trace MR, erectile dysfunction on Viagra 25mg.\par \par Cardiovascular review of symptoms is negative for exertional chest pain, dyspnea, palpitations, dizziness or syncope.  No PND or orthopnea leg edema.  No bleeding or black stool.  Leg and scrotal edema has resolved since Mitral Clip surgery.  \par \par Lasix discontinued Oct 2019.  Patient is compliant with medication.  \par \par Patient had sinus bradycardia with stable blood pressure, amiodarone was reduced from 200 mg daily to 100 mg daily Oct 2019.  Amiodarone discontinued Jan 2020,  3 months postop.  No need to continue long term aspirin  after mitral clip discussed with Dr Elliott. \par \par Prior to MV clip patient had rapid AF in office 7/26/19 given intravenous Lopressor 5 mg x1 and Lopressor 25 mg orally with improvement in AF rate below 100 bpm, stable blood pressure 125/70\par \par Prior to MV clip patient had office evaluation PCP 7/16/19 recurrent chest pain abdominal pain found to be in new-onset rapid atrial fibrillation.  Patient started on Bystolic and Eliquis.  EKG 7/16/19 rapid AF 136bpm, RBBB, NSST.  EKG 7/17/19 sinus rhythm 66 bpm RBBB, LAFB, bifiaicular block\par \par Patient had recurrent atypical chest pain and abdominal pain symptoms July 2019 and  contrast CT abdomen negative for aneurysm. 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 Elective DC cardioversion performed patient started on amiodarone 200mg daily. \par  [FreeTextEntry2] : hx PAF on Eliquis, Toprol, off amiodarone, Mitral Clip 9/4/19 Dr Elliott for severe MR, HFpEF

## 2020-08-12 ENCOUNTER — RX RENEWAL (OUTPATIENT)
Age: 85
End: 2020-08-12

## 2020-10-22 ENCOUNTER — APPOINTMENT (OUTPATIENT)
Dept: CARDIOLOGY | Facility: CLINIC | Age: 85
End: 2020-10-22
Payer: MEDICARE

## 2020-10-22 ENCOUNTER — NON-APPOINTMENT (OUTPATIENT)
Age: 85
End: 2020-10-22

## 2020-10-22 VITALS — OXYGEN SATURATION: 96 % | HEART RATE: 96 BPM | SYSTOLIC BLOOD PRESSURE: 118 MMHG | DIASTOLIC BLOOD PRESSURE: 70 MMHG

## 2020-10-22 VITALS
WEIGHT: 205 LBS | TEMPERATURE: 98.4 F | SYSTOLIC BLOOD PRESSURE: 118 MMHG | HEIGHT: 69 IN | BODY MASS INDEX: 30.36 KG/M2 | OXYGEN SATURATION: 96 % | DIASTOLIC BLOOD PRESSURE: 74 MMHG | HEART RATE: 67 BPM

## 2020-10-22 DIAGNOSIS — I50.30 UNSPECIFIED DIASTOLIC (CONGESTIVE) HEART FAILURE: ICD-10-CM

## 2020-10-22 PROCEDURE — 93306 TTE W/DOPPLER COMPLETE: CPT

## 2020-10-22 PROCEDURE — 0296T: CPT

## 2020-10-22 PROCEDURE — 93000 ELECTROCARDIOGRAM COMPLETE: CPT | Mod: 59

## 2020-10-22 PROCEDURE — 99214 OFFICE O/P EST MOD 30 MIN: CPT

## 2020-10-22 NOTE — DISCUSSION/SUMMARY
[FreeTextEntry1] : Jeanmarie is 85-year-old male with medical history detailed above and active medical issues including:\par \par -  HFpEF, edema resolved after Mitral Clip.  September 2020 patient had increasing leg edema and 8 pound weight gain patient started Lasix 20 mg daily and within 1 week lost 8 pounds with improvement in leg edema. Patient will continue Lasix 20 mg daily repeat labs will be done in 2 months. \par \par - Asymptomatic chronic AF on Eliquis, Toprol, off amiodarone\par \par - Multiple office evaluations with BENJY prior to MV clip.\par \par - Mitral Clip 9/4/19 Dr Elliott, postop echo LVEF 50-55 trace MR . Continue aspirin 3 months postop  to allow endothelialization of mitral clip as per  Dr Elliott  \par \par -  Patient has erectile dysfunction on Viagra 25 mg daily as needed ED\par \par - Hypertension at BP goal less than 130/80 on Toprol\par \par - History of asthma\par \par Patient educated on lifestyle and diet modification with low sodium low fat diet and avoidance of excessive alcohol. Patient is aware to call with any symptoms or concerns.  Recommended increased oral hydration with electrolyte suppliment drinks, avoid caffeine and alcohol intake. Limited lifting <50lbs working as a . \par \par Patient will be seen in cardiology followup 6 month when he returns from [place].. \par \par Patient will monitor home heart rate and blood pressures and daily weight.\par \par Followup with Dr Gurpreet Paulino for primary care. \par \par Addendum 10/23/2020:\par During echocardiogram today patient noted to have a rapid pulse rate.  BP check 120/80 heart rate 130.  EKG rapid AF 130bpm.  Patient declined going to ER.  Toprol 100 mg extra dose given, 2-day Zio patch started.  Patient left office go home against advised to go to ER.  Patient instructed if dyspnea, dizziness, fatigue call 911 and go to ER.

## 2020-10-22 NOTE — REASON FOR VISIT
[Consultation] : a consultation regarding [FreeTextEntry2] : dependan leg edema, hx PAF on Eliquis, Toprol, off amiodarone, Mitral Clip 9/4/19 Dr Elliott for severe MR, HFpEF [FreeTextEntry1] : Jeanmarie is a 85-year-old male with history of hypertension, asthma, severe MR, normal LVEF, recurrent rapid AF on Eliquis, amiodarone maintaining NSR, Toprol, Mitral Clip 9/4/19 Dr Elliott, postop echo LVEF 50-55% trace MR, erectile dysfunction on Viagra 25mg\par \par September 2020 patient had increasing leg edema and 8 pound weight gain patient started Lasix 20 mg daily and within 1 week lost 8 pounds with improvement in leg edema.\par \par Cardiovascular review of symptoms is negative for exertional chest pain, dyspnea, palpitations, dizziness or syncope.  No PND or orthopnea leg edema.  No bleeding or black stool.  Leg and scrotal edema had resolved after mitral Clip surgery.  \par   \par Patient had sinus bradycardia with stable blood pressure, amiodarone was reduced from 200 mg daily to 100 mg daily Oct 2019.  Amiodarone discontinued Jan 2020,  3 months postop.  No need to continue long term aspirin  after mitral clip discussed with Dr Elliott. \par \par Prior to MV clip patient had rapid AF in office 7/26/19 given intravenous Lopressor 5 mg x1 and Lopressor 25 mg orally with improvement in AF rate below 100 bpm, stable blood pressure 125/70\par \par Prior to MV clip patient had office evaluation PCP 7/16/19 recurrent chest pain abdominal pain found to be in new-onset rapid atrial fibrillation.  Patient started on Bystolic and Eliquis.  EKG 7/16/19 rapid AF 136bpm, RBBB, NSST.  EKG 7/17/19 sinus rhythm 66 bpm RBBB, LAFB, bifiaicular block\par \par Patient had recurrent atypical chest pain and abdominal pain symptoms July 2019 and  contrast CT abdomen negative for aneurysm. \par \par No history of CAD, MI, CHF, TIA, CVA, diabetes, PVD, DVT, PE.\par \par Labs October 2020, normal CBC, BMP, LFT on Lasix 20 mg daily\par \par Echocardiogram 10/22/2020, LVEF 45%, mild LVH, double orifice mitral valve with well-seated mitral clip, mild AI, mild pulmonary hypertension, LA 5.2 cm\par \par Holter monitor December 2019 AF, flutter  average heart rate 77 bpm, rare to occasional PVCs, couplets, 3 beat NSVT, no symptoms in the diary\par \par EKG sinus bradycardia 53 bpm, RBBB, LAD\par \par Echocardiogram [facility] post Mitral Clip, LVEF 50-55%, trace MR\par \par Cardiac catheterization July 2019, LVEF 55%, nonobstructive coronaries, moderate severe MR\par \par Transesophageal echocardiogram July 2019, 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 Elective DC cardioversion performed patient started on amiodarone 200mg daily. \par

## 2020-11-02 PROCEDURE — 0298T: CPT

## 2020-12-22 ENCOUNTER — TRANSCRIPTION ENCOUNTER (OUTPATIENT)
Age: 85
End: 2020-12-22

## 2021-05-25 ENCOUNTER — APPOINTMENT (OUTPATIENT)
Dept: CARDIOLOGY | Facility: CLINIC | Age: 86
End: 2021-05-25
Payer: MEDICARE

## 2021-05-25 VITALS
OXYGEN SATURATION: 96 % | WEIGHT: 196 LBS | HEART RATE: 62 BPM | HEIGHT: 69 IN | SYSTOLIC BLOOD PRESSURE: 122 MMHG | BODY MASS INDEX: 29.03 KG/M2 | DIASTOLIC BLOOD PRESSURE: 72 MMHG

## 2021-05-25 PROCEDURE — 99214 OFFICE O/P EST MOD 30 MIN: CPT

## 2021-05-25 NOTE — DISCUSSION/SUMMARY
[FreeTextEntry1] : Jeanmarie is 86-year-old male with medical history detailed above and active medical issues including:\par \par -  HFpEF, edema resolved after Mitral Clip.  Edema improving on Lasix 20 mg daily, 17 pound weight loss May 2021, continue Lasix 20 mg daily with follow-up home BPs.  Repeat labs ordered.\par \par - Asymptomatic chronic AF on Eliquis, Toprol, off amiodarone\par \par - Multiple office evaluations with BENJY prior to MV clip.\par \par - Mitral Clip 9/4/19 Dr Elliott, postop echo LVEF 50-55 trace MR . Continue aspirin 3 months postop  to allow endothelialization of mitral clip as per  Dr Elliott  \par \par -  Patient has erectile dysfunction on Viagra 25 mg daily as needed ED\par \par - Hypertension at BP goal less than 130/80 on Toprol\par \par - History of asthma\par \par Patient educated on lifestyle and diet modification with low sodium low fat diet and avoidance of excessive alcohol. Patient is aware to call with any symptoms or concerns.  Recommended increased oral hydration with electrolyte suppliment drinks, avoid caffeine and alcohol intake. Limited lifting <50lbs working as a . \par \par Patient will be seen in cardiology followup 1 month same day echocardiogram.  \par \par Patient will monitor home heart rate and blood pressures and daily weight.\par \par Followup with Dr Gurpreet Paulino for primary care. \par

## 2021-05-25 NOTE — REASON FOR VISIT
[Consultation] : a consultation regarding [Other: ____] : [unfilled] [FreeTextEntry1] : Jeanmarie is a 86-year-old male with history of hypertension, asthma, severe MR, normal LVEF, recurrent rapid AF on Eliquis, amiodarone maintaining NSR, Toprol, Mitral Clip 9/4/19 Dr Elliott, postop echo LVEF 50-55% trace MR, erectile dysfunction on Viagra 25mg\par \par Patient had accelerated heart rates in [place] started on digoxin and metoprolol increased to 150 mg twice daily.  Patient had increasing pedal edema after driving home from [place] started on Lasix 20 mg daily with 17 pound weight loss. \par \par Cardiovascular review of symptoms is negative for exertional chest pain, dyspnea, palpitations, dizziness or syncope.  No PND or orthopnea leg edema.  No bleeding or black stool.  Leg and scrotal edema had resolved after mitral Clip surgery.  \par   \par Patient had sinus bradycardia with stable blood pressure, amiodarone was reduced from 200 mg daily to 100 mg daily Oct 2019.  Amiodarone discontinued Jan 2020,  3 months postop.  No need to continue long term aspirin  after mitral clip discussed with Dr Elliott. \par \par September 2020 patient had increasing leg edema and 8 pound weight gain patient started Lasix 20 mg daily and within 1 week lost 8 pounds with improvement in leg edema.\par Prior to MV clip patient had rapid AF in office 7/26/19 given intravenous Lopressor 5 mg x1 and Lopressor 25 mg orally with improvement in AF rate below 100 bpm, stable blood pressure 125/70\par \par Prior to MV clip patient had office evaluation PCP 7/16/19 recurrent chest pain abdominal pain found to be in new-onset rapid atrial fibrillation.  Patient started on Bystolic and Eliquis.  EKG 7/16/19 rapid AF 136bpm, RBBB, NSST.  EKG 7/17/19 sinus rhythm 66 bpm RBBB, LAFB, bifiaicular block\par \par Patient had recurrent atypical chest pain and abdominal pain symptoms July 2019 and  contrast CT abdomen negative for aneurysm. \par \par No history of CAD, MI, CHF, TIA, CVA, diabetes, PVD, DVT, PE.\par \par Labs October 2020, normal CBC, BMP, LFT on Lasix 20 mg daily\par \par Echocardiogram 10/22/2020, LVEF 45%, mild LVH, double orifice mitral valve with well-seated mitral clip, mild AI, mild pulmonary hypertension, LA 5.2 cm\par \par Holter monitor December 2019 AF, flutter  average heart rate 77 bpm, rare to occasional PVCs, couplets, 3 beat NSVT, no symptoms in the diary\par \par EKG sinus bradycardia 53 bpm, RBBB, LAD\par \par Echocardiogram [facility] post Mitral Clip, LVEF 50-55%, trace MR\par \par Cardiac catheterization July 2019, LVEF 55%, nonobstructive coronaries, moderate severe MR\par \par Transesophageal echocardiogram July 2019, 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 Elective DC cardioversion performed patient started on amiodarone 200mg daily. \par  [FreeTextEntry2] : dependan leg edema, hx PAF on Eliquis, Toprol, off amiodarone, Mitral Clip 9/4/19 Dr Elliott for severe MR, HFpEF

## 2021-05-25 NOTE — PHYSICAL EXAM
[General Appearance - Well Developed] : well developed [Normal Appearance] : normal appearance [Well Groomed] : well groomed [General Appearance - Well Nourished] : well nourished [No Deformities] : no deformities [General Appearance - In No Acute Distress] : no acute distress [Normal Conjunctiva] : the conjunctiva exhibited no abnormalities [Eyelids - No Xanthelasma] : the eyelids demonstrated no xanthelasmas [Normal Oral Mucosa] : normal oral mucosa [No Oral Cyanosis] : no oral cyanosis [No Oral Pallor] : no oral pallor [Normal Jugular Venous V Waves Present] : normal jugular venous V waves present [Normal Jugular Venous A Waves Present] : normal jugular venous A waves present [No Jugular Venous Garcia A Waves] : no jugular venous garcia A waves [Respiration, Rhythm And Depth] : normal respiratory rhythm and effort [Exaggerated Use Of Accessory Muscles For Inspiration] : no accessory muscle use [Auscultation Breath Sounds / Voice Sounds] : lungs were clear to auscultation bilaterally [Heart Rate And Rhythm] : heart rate and rhythm were normal [Murmurs] : no murmurs present [Abdomen Soft] : soft [Abdomen Tenderness] : non-tender [Abdomen Mass (___ Cm)] : no abdominal mass palpated [Abnormal Walk] : normal gait [Gait - Sufficient For Exercise Testing] : the gait was sufficient for exercise testing [Nail Clubbing] : no clubbing of the fingernails [Cyanosis, Localized] : no localized cyanosis [Petechial Hemorrhages (___cm)] : no petechial hemorrhages [Skin Color & Pigmentation] : normal skin color and pigmentation [No Venous Stasis] : no venous stasis [] : no rash [Skin Lesions] : no skin lesions [No Skin Ulcers] : no skin ulcer [No Xanthoma] : no  xanthoma was observed [Oriented To Time, Place, And Person] : oriented to person, place, and time [Affect] : the affect was normal [Mood] : the mood was normal [No Anxiety] : not feeling anxious [FreeTextEntry1] : irreg 3/6 MARYLIN MR

## 2021-06-21 ENCOUNTER — APPOINTMENT (OUTPATIENT)
Dept: CARDIOLOGY | Facility: CLINIC | Age: 86
End: 2021-06-21
Payer: MEDICARE

## 2021-06-21 ENCOUNTER — NON-APPOINTMENT (OUTPATIENT)
Age: 86
End: 2021-06-21

## 2021-06-21 PROCEDURE — 93306 TTE W/DOPPLER COMPLETE: CPT

## 2021-07-01 ENCOUNTER — APPOINTMENT (OUTPATIENT)
Dept: CARDIOLOGY | Facility: CLINIC | Age: 86
End: 2021-07-01
Payer: MEDICARE

## 2021-07-01 VITALS
WEIGHT: 205 LBS | TEMPERATURE: 97.3 F | BODY MASS INDEX: 30.36 KG/M2 | OXYGEN SATURATION: 95 % | HEIGHT: 69 IN | SYSTOLIC BLOOD PRESSURE: 114 MMHG | HEART RATE: 69 BPM | DIASTOLIC BLOOD PRESSURE: 72 MMHG

## 2021-07-01 PROCEDURE — 99214 OFFICE O/P EST MOD 30 MIN: CPT

## 2021-07-01 NOTE — PHYSICAL EXAM
[General Appearance - Well Developed] : well developed [Normal Appearance] : normal appearance [Well Groomed] : well groomed [General Appearance - Well Nourished] : well nourished [No Deformities] : no deformities [General Appearance - In No Acute Distress] : no acute distress [Eyelids - No Xanthelasma] : the eyelids demonstrated no xanthelasmas [Normal Oral Mucosa] : normal oral mucosa [No Oral Pallor] : no oral pallor [No Oral Cyanosis] : no oral cyanosis [Normal Jugular Venous A Waves Present] : normal jugular venous A waves present [Normal Jugular Venous V Waves Present] : normal jugular venous V waves present [No Jugular Venous Garcia A Waves] : no jugular venous garcia A waves [Respiration, Rhythm And Depth] : normal respiratory rhythm and effort [Exaggerated Use Of Accessory Muscles For Inspiration] : no accessory muscle use [Auscultation Breath Sounds / Voice Sounds] : lungs were clear to auscultation bilaterally [Heart Rate And Rhythm] : heart rate and rhythm were normal [Murmurs] : no murmurs present [Abdomen Soft] : soft [Abdomen Tenderness] : non-tender [Abdomen Mass (___ Cm)] : no abdominal mass palpated [Abnormal Walk] : normal gait [Gait - Sufficient For Exercise Testing] : the gait was sufficient for exercise testing [Nail Clubbing] : no clubbing of the fingernails [Cyanosis, Localized] : no localized cyanosis [Petechial Hemorrhages (___cm)] : no petechial hemorrhages [Skin Color & Pigmentation] : normal skin color and pigmentation [] : no rash [No Venous Stasis] : no venous stasis [Skin Lesions] : no skin lesions [No Skin Ulcers] : no skin ulcer [No Xanthoma] : no  xanthoma was observed [Oriented To Time, Place, And Person] : oriented to person, place, and time [Affect] : the affect was normal [Mood] : the mood was normal [No Anxiety] : not feeling anxious [Well Developed] : well developed [Well Nourished] : well nourished [No Acute Distress] : no acute distress [Normal Conjunctiva] : normal conjunctiva [Normal Venous Pressure] : normal venous pressure [No Carotid Bruit] : no carotid bruit [Normal S1, S2] : normal S1, S2 [No Rub] : no rub [No Gallop] : no gallop [Murmur] : murmur [Clear Lung Fields] : clear lung fields [Good Air Entry] : good air entry [No Respiratory Distress] : no respiratory distress  [Soft] : abdomen soft [Non Tender] : non-tender [No Masses/organomegaly] : no masses/organomegaly [Normal Bowel Sounds] : normal bowel sounds [Normal Gait] : normal gait [No Edema] : no edema [No Cyanosis] : no cyanosis [No Clubbing] : no clubbing [No Varicosities] : no varicosities [No Rash] : no rash [No Skin Lesions] : no skin lesions [Moves all extremities] : moves all extremities [No Focal Deficits] : no focal deficits [Normal Speech] : normal speech [Alert and Oriented] : alert and oriented [Normal memory] : normal memory [de-identified] : 2/6 MARYLIN MR, trace bipedal edema [FreeTextEntry1] : irreg 3/6 MARYLIN MR

## 2021-07-01 NOTE — REASON FOR VISIT
[Other: ____] : [unfilled] [Consultation] : a consultation regarding [FreeTextEntry1] : Jeanmarie is a 86-year-old male with history of hypertension, asthma, severe MR, normal LVEF, recurrent rapid AF on Eliquis, amiodarone maintaining NSR, Toprol, Mitral Clip 9/4/19 Dr Elliott, postop echo LVEF 50-55% trace MR, erectile dysfunction on Viagra 25mg\par \par Cardiovascular review of symptoms is negative for exertional chest pain, dyspnea, palpitations, dizziness or syncope.  No PND or orthopnea leg edema.  No bleeding or black stool.  Leg and scrotal edema had resolved after mitral Clip surgery.\par \par Patient is walking 15 minutes without exertional symptoms, weight and edema has been stable. \par \par Patient had accelerated heart rates in [place] started on digoxin and metoprolol increased to 150 mg twice daily.  Patient had increasing pedal edema after driving home from [place] started on Lasix 20 mg daily with 17 pound weight loss. \par   \par Patient had sinus bradycardia with stable blood pressure, amiodarone was reduced from 200 mg daily to 100 mg daily Oct 2019.  Amiodarone discontinued Jan 2020,  3 months postop.  No need to continue long term aspirin  after mitral clip discussed with Dr Elliott. \par \par September 2020 patient had increasing leg edema and 8 pound weight gain patient started Lasix 20 mg daily and within 1 week lost 8 pounds with improvement in leg edema.\par Prior to MV clip patient had rapid AF in office 7/26/19 given intravenous Lopressor 5 mg x1 and Lopressor 25 mg orally with improvement in AF rate below 100 bpm, stable blood pressure 125/70\par \par Prior to MV clip patient had office evaluation PCP 7/16/19 recurrent chest pain abdominal pain found to be in new-onset rapid atrial fibrillation.  Patient started on Bystolic and Eliquis.  EKG 7/16/19 rapid AF 136bpm, RBBB, NSST.  EKG 7/17/19 sinus rhythm 66 bpm RBBB, LAFB, bifiaicular block\par \par Patient had recurrent atypical chest pain and abdominal pain symptoms July 2019 and  contrast CT abdomen negative for aneurysm. \par \par Echocardiogram June 2021 LVEF 45-50%, well-seated mitral valve clip with dual orifice, mild MR, LA 4.6 cm, moderate pHTN\par \par Echocardiogram 10/22/2020, LVEF 45%, mild LVH, double orifice mitral valve with well-seated mitral clip, mild AI, mild pulmonary hypertension, LA 5.2 cm\par \par Holter monitor December 2019 AF, flutter  average heart rate 77 bpm, rare to occasional PVCs, couplets, 3 beat NSVT, no symptoms in the diary\par \par EKG sinus bradycardia 53 bpm, RBBB, LAD\par \par Echocardiogram [facility] post Mitral Clip, LVEF 50-55%, trace MR\par \par Cardiac catheterization July 2019, LVEF 55%, nonobstructive coronaries, moderate severe MR\par \par Transesophageal echocardiogram July 2019, 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 Elective DC cardioversion performed patient started on amiodarone 200mg daily. \par  [FreeTextEntry2] : dependan leg edema, hx PAF on Eliquis, Toprol, off amiodarone, Mitral Clip 9/4/19 Dr Elliott for severe MR, HFpEF

## 2021-07-01 NOTE — REVIEW OF SYSTEMS
[Lower Ext Edema] : lower extremity edema [Negative] : Heme/Lymph [FreeTextEntry5] : Trace pedal edema

## 2021-07-01 NOTE — DISCUSSION/SUMMARY
[FreeTextEntry1] : Jeanmarie is 86-year-old male with medical history detailed above and active medical issues including:\par \par -  HFpEF, edema resolved after Mitral Clip.  Trace pedal edema on Lasix 20 mg daily, 17 pound weight loss May 2021, continue Lasix 20 mg daily with follow-up home weights and BPs.  Repeat labs ordered.\par \par - Asymptomatic chronic AF on Eliquis, Toprol, off amiodarone\par \par - Multiple office evaluations with BENJY prior to MV clip.\par \par - Mitral Clip 9/4/19 Dr Elliott, postop echo LVEF 50-55 trace MR .  Completed aspirin 3 months postop  to allow endothelialization of mitral clip as per  Dr Elliott  \par \par - Patient has erectile dysfunction on Viagra 25 mg daily as needed ED\par \par - Hypertension at BP goal less than 130/80 on Toprol\par \par - History of asthma\par \par Patient educated on lifestyle and diet modification with low sodium low fat diet and avoidance of excessive alcohol. Patient is aware to call with any symptoms or concerns.  Recommended increased oral hydration with electrolyte suppliment drinks, avoid caffeine and alcohol intake. Limited lifting <50lbs working as a . \par \par Patient will be seen in cardiology followup 3 month same day echocardiogram.  \par \par Patient will monitor home heart rate and blood pressures and daily weight.\par \par Followup with Dr Gurpreet Paulino for primary care. \par

## 2021-10-07 ENCOUNTER — APPOINTMENT (OUTPATIENT)
Dept: CARDIOLOGY | Facility: CLINIC | Age: 86
End: 2021-10-07

## 2021-10-14 ENCOUNTER — APPOINTMENT (OUTPATIENT)
Dept: CARDIOLOGY | Facility: CLINIC | Age: 86
End: 2021-10-14
Payer: MEDICARE

## 2021-10-14 ENCOUNTER — NON-APPOINTMENT (OUTPATIENT)
Age: 86
End: 2021-10-14

## 2021-10-14 VITALS
BODY MASS INDEX: 30.21 KG/M2 | SYSTOLIC BLOOD PRESSURE: 138 MMHG | OXYGEN SATURATION: 99 % | TEMPERATURE: 97.3 F | DIASTOLIC BLOOD PRESSURE: 82 MMHG | HEIGHT: 69 IN | WEIGHT: 204 LBS | HEART RATE: 61 BPM

## 2021-10-14 PROCEDURE — 99214 OFFICE O/P EST MOD 30 MIN: CPT

## 2021-10-14 RX ORDER — ATORVASTATIN CALCIUM 40 MG/1
40 TABLET, FILM COATED ORAL
Qty: 90 | Refills: 1 | Status: DISCONTINUED | COMMUNITY
Start: 2021-06-21 | End: 2021-10-14

## 2021-10-14 NOTE — DISCUSSION/SUMMARY
[FreeTextEntry1] : Jeanmarie is 86-year-old male with medical history detailed above and active medical issues including:\par \par -  HFpEF, edema resolved after Mitral Clip.  Trace pedal edema on Lasix 20 mg daily, 17 pound weight loss May 2021, continue Lasix 20 mg daily with follow-up home weights and BPs.  Repeat labs ordered.\par \par - Asymptomatic chronic AF on Eliquis, Toprol, off amiodarone.  Patient declines EP evaluation. \par \par - Multiple office evaluations with BENJY prior to MV clip.\par \par - Mitral Clip 9/4/19 Dr Elliott, postop echo LVEF 50-55 trace MR .  Completed aspirin 3 months postop  to allow endothelialization of mitral clip as per  Dr Elliott  \par \par - Patient has erectile dysfunction on Viagra 25 mg daily as needed ED\par \par - Hypertension.   Average resting home BPs above guideline goal on Toprol Lasix. Cozaar 25 mg daily added to the medical regimen with follow-up home BPs and repeat labs\par \par - History of asthma\par \par Patient educated on lifestyle and diet modification with low sodium low fat diet and avoidance of excessive alcohol. Patient is aware to call with any symptoms or concerns.  Recommended increased oral hydration with electrolyte suppliment drinks, avoid caffeine and alcohol intake. Limited lifting <50lbs working as a . \par \par Patient will be seen in cardiology followup 3 month same day echocardiogram.  \par \par Patient will monitor home heart rate and blood pressures and daily weight.\par \par Followup with Dr Gurpreet Paulino for primary care. \par

## 2021-10-14 NOTE — REASON FOR VISIT
[Other: ____] : [unfilled] [Consultation] : a consultation regarding [FreeTextEntry1] : Jeanmarie is a 86-year-old male with history of hypertension, asthma, severe MR, normal LVEF, recurrent rapid AF on Eliquis, amiodarone maintaining NSR, Toprol, Mitral Clip 9/4/19 Dr Elliott, postop echo LVEF 50-55% trace MR, erectile dysfunction on Viagra 25mg\par \par Cardiovascular review of symptoms is negative for exertional chest pain, dyspnea, palpitations, dizziness or syncope.  No PND or orthopnea leg edema.  No bleeding or black stool.  Leg and scrotal edema had resolved after mitral Clip surgery.\par \par Patient is walking 15 minutes without exertional symptoms, weight and edema has been stable.\par \par  Average resting home BPs above guideline goal on Toprol Lasix.  Cozaar 25 mg daily added to the medical regimen with follow-up home BPs and repeat labs\par \par Patient had accelerated heart rates in [place] started on digoxin and metoprolol increased to 150 mg twice daily.  Patient had increasing pedal edema after driving home from [place] started on Lasix 20 mg daily with 17 pound weight loss. \par   \par Patient had sinus bradycardia with stable blood pressure, amiodarone was reduced from 200 mg daily to 100 mg daily Oct 2019.  Amiodarone discontinued Jan 2020,  3 months postop.  No need to continue long term aspirin  after mitral clip discussed with Dr Elliott. \par \par September 2020 patient had increasing leg edema and 8 pound weight gain patient started Lasix 20 mg daily and within 1 week lost 8 pounds with improvement in leg edema.\par Prior to MV clip patient had rapid AF in office 7/26/19 given intravenous Lopressor 5 mg x1 and Lopressor 25 mg orally with improvement in AF rate below 100 bpm, stable blood pressure 125/70\par \par Prior to MV clip patient had office evaluation PCP 7/16/19 recurrent chest pain abdominal pain found to be in new-onset rapid atrial fibrillation.  Patient started on Bystolic and Eliquis.  EKG 7/16/19 rapid AF 136bpm, RBBB, NSST.  EKG 7/17/19 sinus rhythm 66 bpm RBBB, LAFB, bifiaicular block\par \par Patient had recurrent atypical chest pain and abdominal pain symptoms July 2019 and  contrast CT abdomen negative for aneurysm. \par \par Echocardiogram June 2021 LVEF 45-50%, well-seated mitral valve clip with dual orifice, mild MR, LA 4.6 cm, moderate pHTN\par \par Echocardiogram 10/22/2020, LVEF 45%, mild LVH, double orifice mitral valve with well-seated mitral clip, mild AI, mild pulmonary hypertension, LA 5.2 cm\par \par Holter monitor December 2019 AF, flutter  average heart rate 77 bpm, rare to occasional PVCs, couplets, 3 beat NSVT, no symptoms in the diary\par \par EKG sinus bradycardia 53 bpm, RBBB, LAD\par \par Echocardiogram [facility] post Mitral Clip, LVEF 50-55%, trace MR\par \par Cardiac catheterization July 2019, LVEF 55%, nonobstructive coronaries, moderate severe MR\par \par Transesophageal echocardiogram July 2019, 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 Elective DC cardioversion performed patient started on amiodarone 200mg daily. \par  [FreeTextEntry2] : dependan leg edema, hx PAF on Eliquis, Toprol, off amiodarone, Mitral Clip 9/4/19 Dr Elliott for severe MR, HFpEF

## 2021-10-14 NOTE — PHYSICAL EXAM
[Well Developed] : well developed [Well Nourished] : well nourished [No Acute Distress] : no acute distress [Normal Venous Pressure] : normal venous pressure [No Carotid Bruit] : no carotid bruit [Normal S1, S2] : normal S1, S2 [No Rub] : no rub [No Gallop] : no gallop [Murmur] : murmur [Clear Lung Fields] : clear lung fields [Good Air Entry] : good air entry [No Respiratory Distress] : no respiratory distress  [Soft] : abdomen soft [Non Tender] : non-tender [No Masses/organomegaly] : no masses/organomegaly [Normal Bowel Sounds] : normal bowel sounds [Normal Gait] : normal gait [No Edema] : no edema [No Cyanosis] : no cyanosis [No Clubbing] : no clubbing [No Varicosities] : no varicosities [No Rash] : no rash [No Skin Lesions] : no skin lesions [Moves all extremities] : moves all extremities [No Focal Deficits] : no focal deficits [Normal Speech] : normal speech [Alert and Oriented] : alert and oriented [Normal memory] : normal memory [General Appearance - Well Developed] : well developed [Normal Appearance] : normal appearance [Well Groomed] : well groomed [General Appearance - Well Nourished] : well nourished [No Deformities] : no deformities [General Appearance - In No Acute Distress] : no acute distress [Normal Conjunctiva] : the conjunctiva exhibited no abnormalities [Eyelids - No Xanthelasma] : the eyelids demonstrated no xanthelasmas [Normal Oral Mucosa] : normal oral mucosa [No Oral Pallor] : no oral pallor [No Oral Cyanosis] : no oral cyanosis [Normal Jugular Venous A Waves Present] : normal jugular venous A waves present [Normal Jugular Venous V Waves Present] : normal jugular venous V waves present [No Jugular Venous Garcia A Waves] : no jugular venous garcia A waves [Respiration, Rhythm And Depth] : normal respiratory rhythm and effort [Exaggerated Use Of Accessory Muscles For Inspiration] : no accessory muscle use [Auscultation Breath Sounds / Voice Sounds] : lungs were clear to auscultation bilaterally [Heart Rate And Rhythm] : heart rate and rhythm were normal [Murmurs] : no murmurs present [Abdomen Soft] : soft [Abdomen Tenderness] : non-tender [Abdomen Mass (___ Cm)] : no abdominal mass palpated [Abnormal Walk] : normal gait [Gait - Sufficient For Exercise Testing] : the gait was sufficient for exercise testing [Nail Clubbing] : no clubbing of the fingernails [Cyanosis, Localized] : no localized cyanosis [Petechial Hemorrhages (___cm)] : no petechial hemorrhages [Skin Color & Pigmentation] : normal skin color and pigmentation [] : no rash [No Venous Stasis] : no venous stasis [Skin Lesions] : no skin lesions [No Skin Ulcers] : no skin ulcer [No Xanthoma] : no  xanthoma was observed [Oriented To Time, Place, And Person] : oriented to person, place, and time [Affect] : the affect was normal [Mood] : the mood was normal [No Anxiety] : not feeling anxious [No Murmur] : no murmur [de-identified] : 2/6 MARYLIN MR, trace bipedal edema [FreeTextEntry1] : irreg 3/6 MARYLIN MR

## 2021-12-20 NOTE — H&P PST ADULT - WEIGHT IN KG
albuterol 2.5 mg/3 mL (0.083%) inhalation solution: 3 milliliter(s) inhaled every 6 hours  Ambien 10 mg oral tablet: 1 tab(s) orally once a day (at bedtime)  escitalopram 10 mg oral tablet: 1 tab(s) orally once a day  famotidine 20 mg oral tablet: 1 tab(s) orally once a day  fluticasone 0.5 mg/2 mL inhalation suspension:   levalbuterol 45 mcg/inh inhalation aerosol: 2 puff(s) inhaled every 4 hours  Protonix 40 mg oral delayed release tablet: 1 tab(s) orally once a day  Wixela Inhub 250 mcg-50 mcg inhalation powder: 1 puff(s) inhaled 2 times a day   albuterol 90 mcg/inh inhalation aerosol: 2 puff(s) inhaled every 6 hours, As needed, Shortness of Breath and/or Wheezing  aluminum hydroxide-magnesium hydroxide 200 mg-200 mg/5 mL oral suspension: 30 milliliter(s) orally every 6 hours, As needed, Dyspepsia  Ambien 10 mg oral tablet: 1 tab(s) orally once a day (at bedtime)  amLODIPine 5 mg oral tablet: 1 tab(s) orally once a day   benzonatate 100 mg oral capsule: 1 cap(s) orally 3 times a day  budesonide-formoterol 80 mcg-4.5 mcg/inh inhalation aerosol: 2 puff(s) inhaled 2 times a day  calcium carbonate 500 mg (200 mg elemental calcium) oral tablet, chewable: 1 tab(s) orally 2 times a day  escitalopram 10 mg oral tablet: 1 tab(s) orally once a day  famotidine 20 mg oral tablet: 1 tab(s) orally once a day  fluticasone 0.5 mg/2 mL inhalation suspension:   guaiFENesin 600 mg oral tablet, extended release: 1 tab(s) orally every 12 hours  levalbuterol 45 mcg/inh inhalation aerosol: 2 puff(s) inhaled every 4 hours  predniSONE 20 mg oral tablet: 1 tab(s) orally once a day  Start Date: 12/28/2021  saccharomyces boulardii lyo 250 mg oral capsule: 1 cap(s) orally 2 times a day  Wixela Inhub 250 mcg-50 mcg inhalation powder: 1 puff(s) inhaled 2 times a day   89

## 2022-02-23 ENCOUNTER — RX RENEWAL (OUTPATIENT)
Age: 87
End: 2022-02-23

## 2022-03-08 ENCOUNTER — RX RENEWAL (OUTPATIENT)
Age: 87
End: 2022-03-08

## 2022-04-14 ENCOUNTER — NON-APPOINTMENT (OUTPATIENT)
Age: 87
End: 2022-04-14

## 2022-04-14 RX ORDER — FUROSEMIDE 20 MG/1
20 TABLET ORAL
Qty: 90 | Refills: 1 | Status: DISCONTINUED | COMMUNITY
Start: 2020-10-13 | End: 2022-04-14

## 2022-05-31 ENCOUNTER — APPOINTMENT (OUTPATIENT)
Dept: CARDIOLOGY | Facility: CLINIC | Age: 87
End: 2022-05-31
Payer: MEDICARE

## 2022-05-31 VITALS
WEIGHT: 200 LBS | SYSTOLIC BLOOD PRESSURE: 140 MMHG | OXYGEN SATURATION: 96 % | TEMPERATURE: 98 F | HEART RATE: 76 BPM | DIASTOLIC BLOOD PRESSURE: 70 MMHG | BODY MASS INDEX: 29.62 KG/M2 | HEIGHT: 69 IN

## 2022-05-31 PROCEDURE — 99215 OFFICE O/P EST HI 40 MIN: CPT

## 2022-05-31 NOTE — PHYSICAL EXAM
[Well Developed] : well developed [Well Nourished] : well nourished [No Acute Distress] : no acute distress [Normal Venous Pressure] : normal venous pressure [No Carotid Bruit] : no carotid bruit [Normal S1, S2] : normal S1, S2 [No Murmur] : no murmur [No Rub] : no rub [No Gallop] : no gallop [Murmur] : murmur [Clear Lung Fields] : clear lung fields [Good Air Entry] : good air entry [No Respiratory Distress] : no respiratory distress  [Soft] : abdomen soft [Non Tender] : non-tender [No Masses/organomegaly] : no masses/organomegaly [Normal Bowel Sounds] : normal bowel sounds [Normal Gait] : normal gait [No Edema] : no edema [No Cyanosis] : no cyanosis [No Clubbing] : no clubbing [No Varicosities] : no varicosities [No Rash] : no rash [No Skin Lesions] : no skin lesions [Moves all extremities] : moves all extremities [No Focal Deficits] : no focal deficits [Normal Speech] : normal speech [Alert and Oriented] : alert and oriented [Normal memory] : normal memory [General Appearance - Well Developed] : well developed [Normal Appearance] : normal appearance [Well Groomed] : well groomed [General Appearance - Well Nourished] : well nourished [No Deformities] : no deformities [General Appearance - In No Acute Distress] : no acute distress [Normal Conjunctiva] : the conjunctiva exhibited no abnormalities [Eyelids - No Xanthelasma] : the eyelids demonstrated no xanthelasmas [Normal Oral Mucosa] : normal oral mucosa [No Oral Pallor] : no oral pallor [No Oral Cyanosis] : no oral cyanosis [Normal Jugular Venous A Waves Present] : normal jugular venous A waves present [Normal Jugular Venous V Waves Present] : normal jugular venous V waves present [No Jugular Venous Garcia A Waves] : no jugular venous garcia A waves [Respiration, Rhythm And Depth] : normal respiratory rhythm and effort [Exaggerated Use Of Accessory Muscles For Inspiration] : no accessory muscle use [Auscultation Breath Sounds / Voice Sounds] : lungs were clear to auscultation bilaterally [Heart Rate And Rhythm] : heart rate and rhythm were normal [Murmurs] : no murmurs present [Abdomen Soft] : soft [Abdomen Tenderness] : non-tender [Abdomen Mass (___ Cm)] : no abdominal mass palpated [Abnormal Walk] : normal gait [Gait - Sufficient For Exercise Testing] : the gait was sufficient for exercise testing [Nail Clubbing] : no clubbing of the fingernails [Cyanosis, Localized] : no localized cyanosis [Petechial Hemorrhages (___cm)] : no petechial hemorrhages [Skin Color & Pigmentation] : normal skin color and pigmentation [] : no rash [No Venous Stasis] : no venous stasis [Skin Lesions] : no skin lesions [No Skin Ulcers] : no skin ulcer [No Xanthoma] : no  xanthoma was observed [Oriented To Time, Place, And Person] : oriented to person, place, and time [Affect] : the affect was normal [Mood] : the mood was normal [No Anxiety] : not feeling anxious [de-identified] : 2/6 MARYLIN MR, trace bipedal edema [FreeTextEntry1] : irreg 3/6 MARYLIN MR

## 2022-05-31 NOTE — DISCUSSION/SUMMARY
[FreeTextEntry1] : Jeanmarie is 87-year-old male with medical history detailed above and active medical issues including:\par \par -  HFpEF, edema resolved after Mitral Clip. Trace pedal edema on Lasix 20 mg daily, 17 pound weight loss May 2021, continue Lasix 20 mg daily with follow-up home weights and BPs.  Repeat labs ordered.\par \par - Asymptomatic chronic AF on Eliquis, Toprol, off amiodarone.  Patient declines EP evaluation. \par \par - Multiple office evaluations with BENJ[facility] prior to MV clip.\par \par - Mitral Clip 9/4/19 Dr Elliott, postop echo LVEF 50-55 trace MR .  Completed aspirin 3 months postop  to allow endothelialization of mitral clip as per  Dr Elliott  \par \par - Patient has erectile dysfunction on Viagra 25 mg daily as needed ED\par \par - Hypertension.   Average resting home BPs above guideline goal on Toprol Lasix. Cozaar 25 mg daily added to the medical regimen with follow-up home BPs and repeat labs\par \par - History of asthma\par \par Patient educated on lifestyle and diet modification with low sodium low fat diet and avoidance of excessive alcohol. Patient is aware to call with any symptoms or concerns.  Recommended increased oral hydration with electrolyte suppliment drinks, avoid caffeine and alcohol intake. Limited lifting <50lbs working as a . \par \par Patient will be seen in cardiology followup 6 months with exercise stress echo and echocardiogram.  \par \par Patient will monitor home heart rate and blood pressures and daily weight.\par \par Followup with Dr Gurpreet Paulino for primary care. \par

## 2022-06-30 ENCOUNTER — APPOINTMENT (OUTPATIENT)
Dept: CARDIOLOGY | Facility: CLINIC | Age: 87
End: 2022-06-30

## 2022-06-30 ENCOUNTER — NON-APPOINTMENT (OUTPATIENT)
Age: 87
End: 2022-06-30

## 2022-06-30 VITALS
SYSTOLIC BLOOD PRESSURE: 122 MMHG | OXYGEN SATURATION: 96 % | DIASTOLIC BLOOD PRESSURE: 68 MMHG | HEART RATE: 94 BPM | WEIGHT: 204 LBS | HEIGHT: 69 IN | BODY MASS INDEX: 30.21 KG/M2

## 2022-06-30 PROCEDURE — 99214 OFFICE O/P EST MOD 30 MIN: CPT

## 2022-06-30 RX ORDER — KETOCONAZOLE 20 MG/G
2 CREAM TOPICAL
Qty: 60 | Refills: 0 | Status: DISCONTINUED | COMMUNITY
Start: 2021-11-30 | End: 2022-06-30

## 2022-06-30 RX ORDER — MULTIVITAMIN
CAPSULE ORAL DAILY
Refills: 0 | Status: DISCONTINUED | COMMUNITY
End: 2022-06-30

## 2022-06-30 RX ORDER — CLOTRIMAZOLE AND BETAMETHASONE DIPROPIONATE 10; .5 MG/G; MG/G
1-0.05 CREAM TOPICAL
Qty: 45 | Refills: 0 | Status: DISCONTINUED | COMMUNITY
Start: 2022-01-27 | End: 2022-06-30

## 2022-06-30 NOTE — PHYSICAL EXAM
[Well Developed] : well developed [Well Nourished] : well nourished [No Acute Distress] : no acute distress [Normal Venous Pressure] : normal venous pressure [No Carotid Bruit] : no carotid bruit [Normal S1, S2] : normal S1, S2 [No Murmur] : no murmur [No Rub] : no rub [No Gallop] : no gallop [Murmur] : murmur [Clear Lung Fields] : clear lung fields [Good Air Entry] : good air entry [No Respiratory Distress] : no respiratory distress  [Soft] : abdomen soft [Non Tender] : non-tender [No Masses/organomegaly] : no masses/organomegaly [Normal Bowel Sounds] : normal bowel sounds [Normal Gait] : normal gait [No Edema] : no edema [No Cyanosis] : no cyanosis [No Clubbing] : no clubbing [No Varicosities] : no varicosities [No Rash] : no rash [No Skin Lesions] : no skin lesions [Moves all extremities] : moves all extremities [No Focal Deficits] : no focal deficits [Normal Speech] : normal speech [Alert and Oriented] : alert and oriented [Normal memory] : normal memory [General Appearance - Well Developed] : well developed [Normal Appearance] : normal appearance [Well Groomed] : well groomed [General Appearance - Well Nourished] : well nourished [No Deformities] : no deformities [General Appearance - In No Acute Distress] : no acute distress [Normal Conjunctiva] : the conjunctiva exhibited no abnormalities [Eyelids - No Xanthelasma] : the eyelids demonstrated no xanthelasmas [Normal Oral Mucosa] : normal oral mucosa [No Oral Pallor] : no oral pallor [No Oral Cyanosis] : no oral cyanosis [Normal Jugular Venous A Waves Present] : normal jugular venous A waves present [Normal Jugular Venous V Waves Present] : normal jugular venous V waves present [No Jugular Venous Garcia A Waves] : no jugular venous garcia A waves [Respiration, Rhythm And Depth] : normal respiratory rhythm and effort [Exaggerated Use Of Accessory Muscles For Inspiration] : no accessory muscle use [Auscultation Breath Sounds / Voice Sounds] : lungs were clear to auscultation bilaterally [Heart Rate And Rhythm] : heart rate and rhythm were normal [Murmurs] : no murmurs present [Abdomen Soft] : soft [Abdomen Tenderness] : non-tender [Abdomen Mass (___ Cm)] : no abdominal mass palpated [Abnormal Walk] : normal gait [Gait - Sufficient For Exercise Testing] : the gait was sufficient for exercise testing [Nail Clubbing] : no clubbing of the fingernails [Cyanosis, Localized] : no localized cyanosis [Petechial Hemorrhages (___cm)] : no petechial hemorrhages [Skin Color & Pigmentation] : normal skin color and pigmentation [] : no rash [No Venous Stasis] : no venous stasis [Skin Lesions] : no skin lesions [No Skin Ulcers] : no skin ulcer [Oriented To Time, Place, And Person] : oriented to person, place, and time [Affect] : the affect was normal [Mood] : the mood was normal [No Anxiety] : not feeling anxious [de-identified] : 2/6 MARYLIN MR, trace bipedal edema [FreeTextEntry1] : irreg 3/6 MARYLIN MR. Bilateral edema.  Prominent varicose vein on the left leg.

## 2022-06-30 NOTE — DISCUSSION/SUMMARY
[FreeTextEntry1] : Jeanmarie is 87-year-old male with medical history detailed above and active medical issues including:\par \par -  HFpEF, edema had resolved after Mitral Clip.  In the recent few days, edema has returned with some weight gain and dyspnea.  Increase Lasix to 40 mg daily for 3 days and then go back to 20 mg daily.\par \par -Headache for few days.  Check CT scan without contrast.  She is on anticoagulation.  No fall or head trauma.\par \par - Asymptomatic chronic AF on Eliquis, Toprol, off amiodarone.  Patient declines EP evaluation. \par \par - Multiple office evaluations with [facility] prior to MV clip.\par \par - Mitral Clip 9/4/19 Dr Elliott, postop echo LVEF 50-55 trace MR .  Completed aspirin 3 months postop  to allow endothelialization of mitral clip as per  Dr Elliott  \par \par \par Patient will monitor home heart rate and blood pressures and daily weight.  Will need further follow-up depending upon Lasix response and CT scan findings.\par \par Followup with Dr Gurpreet Paulino for primary care. \par \par Thank you for this referral and allowing me to participate in the care of this patient.  If I can be of any further help or  if you have any questions, please do not hesitate to contact me\par \par \par Sincerely,\par \par Cy Romeo MD, [facility], [place]

## 2022-06-30 NOTE — REASON FOR VISIT
[Other: ____] : [unfilled] [Consultation] : a consultation regarding [FreeTextEntry1] : Jeanmarie is a 87-year-old male with history of hypertension, asthma, severe MR, normal LVEF, recurrent rapid AF on Eliquis, amiodarone maintaining NSR, Toprol, Mitral Clip 9/4/19 Dr Elliott, postop echo LVEF 50-55% trace MR, erectile dysfunction on Viagra 25mg\par \par Cardiovascular review of symptoms: For the past 3 to 4 days, he has had a occipital headache, mild, constant.  Also slight increase in pedal edema and 4 pound weight gain and slight increase in dyspnea on exertion. \par \par ROS is negative for exertional chest pain, palpitations, dizziness or syncope.  No PND or orthopnea leg edema.  No bleeding or black stool.  Leg and scrotal edema had resolved after mitral Clip surgery.\par \par Blood pressure shows good control.  Heart rate 94, irregular in A. fib.\par \par Patient had accelerated heart rates in [place] -he was started on digoxin and metoprolol increased to 150 mg twice daily.  Previously, patient had increasing pedal edema after driving home from [place] started on Lasix 20 mg daily he had 17 pound weight loss. \par   \par  Amiodarone discontinued Jan 2020,  3 months postop. \par \par Prior to MV clip patient had rapid AF in office 7/26/19 given intravenous Lopressor 5 mg x1 and Lopressor 25 mg orally with improvement in AF rate below 100 bpm, stable blood pressure 125/70\par \par Prior to MV clip patient had office evaluation PCP 7/16/19 recurrent chest pain abdominal pain found to be in new-onset rapid atrial fibrillation.  Patient started on Bystolic and Eliquis. \par \par Echocardiogram June 2021 LVEF 45-50%, well-seated mitral valve clip with dual orifice, mild MR, LA 4.6 cm, moderate pHTN\par \par Echocardiogram 10/22/2020, LVEF 45%, mild LVH, double orifice mitral valve with well-seated mitral clip, mild AI, mild pulmonary hypertension, LA 5.2 cm\par \par Holter monitor December 2019 AF, flutter  average heart rate 77 bpm, rare to occasional PVCs, couplets, 3 beat NSVT, no symptoms in the diary\par \par EKG sinus bradycardia 53 bpm, RBBB, LAD\par \par Echocardiogram [facility] post Mitral Clip, LVEF 50-55%, trace MR\par \par Cardiac catheterization July 2019, LVEF 55%, nonobstructive coronaries, moderate severe MR\par \par Transesophageal echocardiogram July 2019, 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 Elective DC cardioversion performed patient started on amiodarone 200mg daily. \par  [FreeTextEntry2] : dependan leg edema, hx PAF on Eliquis, Toprol, off amiodarone, Mitral Clip 9/4/19 Dr Elliott for severe MR, HFpEF

## 2022-07-11 ENCOUNTER — NON-APPOINTMENT (OUTPATIENT)
Age: 87
End: 2022-07-11

## 2022-07-19 ENCOUNTER — NON-APPOINTMENT (OUTPATIENT)
Age: 87
End: 2022-07-19

## 2022-08-01 ENCOUNTER — NON-APPOINTMENT (OUTPATIENT)
Age: 87
End: 2022-08-01

## 2022-08-01 ENCOUNTER — APPOINTMENT (OUTPATIENT)
Dept: CARDIOLOGY | Facility: CLINIC | Age: 87
End: 2022-08-01

## 2022-08-01 VITALS
HEIGHT: 69 IN | RESPIRATION RATE: 14 BRPM | WEIGHT: 200 LBS | TEMPERATURE: 97.8 F | DIASTOLIC BLOOD PRESSURE: 62 MMHG | BODY MASS INDEX: 29.62 KG/M2 | HEART RATE: 50 BPM | SYSTOLIC BLOOD PRESSURE: 114 MMHG | OXYGEN SATURATION: 96 %

## 2022-08-01 DIAGNOSIS — R06.09 OTHER FORMS OF DYSPNEA: ICD-10-CM

## 2022-08-01 DIAGNOSIS — I34.0 NONRHEUMATIC MITRAL (VALVE) INSUFFICIENCY: ICD-10-CM

## 2022-08-01 DIAGNOSIS — M25.471 EFFUSION, RIGHT ANKLE: ICD-10-CM

## 2022-08-01 DIAGNOSIS — Z87.898 PERSONAL HISTORY OF OTHER SPECIFIED CONDITIONS: ICD-10-CM

## 2022-08-01 DIAGNOSIS — M25.472 EFFUSION, RIGHT ANKLE: ICD-10-CM

## 2022-08-01 PROCEDURE — 99214 OFFICE O/P EST MOD 30 MIN: CPT

## 2022-08-01 RX ORDER — FUROSEMIDE 20 MG/1
20 TABLET ORAL
Qty: 90 | Refills: 1 | Status: DISCONTINUED | COMMUNITY
Start: 2022-05-31 | End: 2022-08-01

## 2022-08-01 NOTE — PHYSICAL EXAM
[Well Developed] : well developed [Well Nourished] : well nourished [No Acute Distress] : no acute distress [Normal Venous Pressure] : normal venous pressure [No Carotid Bruit] : no carotid bruit [Normal S1, S2] : normal S1, S2 [No Murmur] : no murmur [No Rub] : no rub [No Gallop] : no gallop [Murmur] : murmur [Clear Lung Fields] : clear lung fields [Good Air Entry] : good air entry [No Respiratory Distress] : no respiratory distress  [Soft] : abdomen soft [Non Tender] : non-tender [No Masses/organomegaly] : no masses/organomegaly [Normal Bowel Sounds] : normal bowel sounds [Normal Gait] : normal gait [No Edema] : no edema [No Cyanosis] : no cyanosis [No Clubbing] : no clubbing [No Varicosities] : no varicosities [No Rash] : no rash [No Skin Lesions] : no skin lesions [Moves all extremities] : moves all extremities [No Focal Deficits] : no focal deficits [Normal Speech] : normal speech [Alert and Oriented] : alert and oriented [Normal memory] : normal memory [General Appearance - Well Developed] : well developed [Normal Appearance] : normal appearance [Well Groomed] : well groomed [General Appearance - Well Nourished] : well nourished [No Deformities] : no deformities [General Appearance - In No Acute Distress] : no acute distress [Normal Conjunctiva] : the conjunctiva exhibited no abnormalities [Eyelids - No Xanthelasma] : the eyelids demonstrated no xanthelasmas [Normal Oral Mucosa] : normal oral mucosa [No Oral Pallor] : no oral pallor [No Oral Cyanosis] : no oral cyanosis [Normal Jugular Venous A Waves Present] : normal jugular venous A waves present [Normal Jugular Venous V Waves Present] : normal jugular venous V waves present [No Jugular Venous Garcia A Waves] : no jugular venous garcia A waves [Respiration, Rhythm And Depth] : normal respiratory rhythm and effort [Exaggerated Use Of Accessory Muscles For Inspiration] : no accessory muscle use [Auscultation Breath Sounds / Voice Sounds] : lungs were clear to auscultation bilaterally [Heart Rate And Rhythm] : heart rate and rhythm were normal [Murmurs] : no murmurs present [Abdomen Soft] : soft [Abdomen Tenderness] : non-tender [Abdomen Mass (___ Cm)] : no abdominal mass palpated [Abnormal Walk] : normal gait [Gait - Sufficient For Exercise Testing] : the gait was sufficient for exercise testing [Nail Clubbing] : no clubbing of the fingernails [Cyanosis, Localized] : no localized cyanosis [Petechial Hemorrhages (___cm)] : no petechial hemorrhages [Skin Color & Pigmentation] : normal skin color and pigmentation [] : no rash [No Venous Stasis] : no venous stasis [Skin Lesions] : no skin lesions [No Skin Ulcers] : no skin ulcer [Oriented To Time, Place, And Person] : oriented to person, place, and time [Affect] : the affect was normal [Mood] : the mood was normal [No Anxiety] : not feeling anxious [de-identified] : 2/6 MARYLIN MR, trace bipedal edema [FreeTextEntry1] : irreg.  Prominent varicose vein on the left leg.

## 2022-08-01 NOTE — REASON FOR VISIT
[Consultation] : a consultation regarding [FreeTextEntry1] : Jeanmarie is a 87-year-old male with history of hypertension, asthma, history of severe MR treated with mitral clip, normal LVEF, recurrent rapid AF on Eliquis, amiodarone maintaining NSR, Toprol,  postop echo LVEF 50-55% trace MR, erectile dysfunction on Viagra 25mg\par \par He had COVID.  Post COVID, he continues to have fatigue.  It has been several weeks.  He cut back on Lasix but has not helped.\par \par ROS is negative for exertional chest pain, palpitations, dizziness or syncope.  No PND or orthopnea leg edema.  No bleeding or black stool.  Leg and scrotal edema had resolved after mitral Clip surgery.\par \par Blood pressure shows good control.  No postural hypotension\par \par  Amiodarone discontinued Jan 2020,  3 months postop. \par \par Prior to MV clip patient had rapid AF in office 7/26/19 given intravenous Lopressor 5 mg x1 and Lopressor 25 mg orally with improvement in AF rate below 100 bpm, stable blood pressure 125/70\par \par Tests:\par \par Echocardiogram June 2021 LVEF 45-50%, well-seated mitral valve clip with dual orifice, mild MR, LA 4.6 cm, moderate pHTN\par \par Echocardiogram 10/22/2020, LVEF 45%, mild LVH, double orifice mitral valve with well-seated mitral clip, mild AI, mild pulmonary hypertension, LA 5.2 cm\par \par Holter monitor December 2019 AF, flutter  average heart rate 77 bpm, rare to occasional PVCs, couplets, 3 beat NSVT, no symptoms in the diary\par \par EKG sinus bradycardia 53 bpm, RBBB, LAD\par \par Echocardiogram [facility] post Mitral Clip, LVEF 50-55%, trace MR\par \par Cardiac catheterization July 2019, LVEF 55%, nonobstructive coronaries, moderate severe MR\par \par Transesophageal echocardiogram July 2019, 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 Elective DC cardioversion performed patient started on amiodarone 200mg daily. \par  [FreeTextEntry2] : dependan leg edema, hx PAF on Eliquis, Toprol, off amiodarone, Mitral Clip 9/4/19 Dr Elliott for severe MR, HFpEF

## 2022-08-01 NOTE — DISCUSSION/SUMMARY
[FreeTextEntry1] : Jeanmarie is 87-year-old male with medical history detailed above and active medical issues including:\par \par -  HFpEF, edema had resolved after Mitral Clip.  Edema has improved.  He is on less Lasix.\par \par - Asymptomatic chronic AF on Eliquis, Toprol, off amiodarone.  Patient declines EP evaluation. \par \par - Multiple office evaluations with BENJ[facility] prior to MV clip.  Notes are well controlled now\par \par - Mitral Clip 9/4/19 Dr Elliott, postop echo LVEF 50-55 trace MR .  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  Check TSH.  Reduce metoprolol to 100 twice daily.  Will reduce Lasix on his own.  Ensure adequate hydration.\par \par Thank you for this referral and allowing me to participate in the care of this patient.  If I can be of any further help or  if you have any questions, please do not hesitate to contact me\par \par \par Sincerely,\par \par Cy Romeo MD, [facility], [place]

## 2022-08-05 ENCOUNTER — NON-APPOINTMENT (OUTPATIENT)
Age: 87
End: 2022-08-05

## 2022-08-11 ENCOUNTER — NON-APPOINTMENT (OUTPATIENT)
Age: 87
End: 2022-08-11

## 2022-08-31 NOTE — INPATIENT CERTIFICATION FOR MEDICARE PATIENTS - THE SEVERITY OF SIGNS/SYMPTOMS. (SEE ED/ADMIT DOCUMENTS)
EMERGENCY DEPARTMENT HISTORY AND PHYSICAL EXAM    Date: 8/31/2022  Patient Name: Frantz Charles    History of Presenting Illness     Time Seen:10:53 AM    Chief Complaint   Patient presents with    Nausea    Vomiting       History Provided By: Patient    Additional History (Context):   Frantz Charles is a 63 y.o. female who presents to the emergency room with c/o sudden onset of severe right upper quadrant/epigastric abdominal pain with radiation through to her back. Patient was driving down the road when she started to experience severe pain associated with nausea and vomiting. Pain radiated through to her back. Constant pain at this point. Varies with intensity. No documented fever. Did have some chills and sweats. Still nauseous. Patient is concerned this may be her gallbladder. She has been told years ago by ultrasound showing gallbladder sludge. Has a history of kidney stones. Also status post appendectomy. States ate [employer] hotdog last night as well as remnants of food from a local restaurant that was fried. This morning she ate bread and cheese. No chest pain or shortness of breath. PCP: None    Current Outpatient Medications   Medication Sig Dispense Refill    cephALEXin (Keflex) 500 mg capsule Take 1 Capsule by mouth three (3) times daily for 7 days. 21 Capsule 0    ketorolac (TORADOL) 10 mg tablet Take 1 Tablet by mouth every eight (8) hours as needed for Pain. 15 Tablet 0    HYDROcodone-acetaminophen (Norco) 5-325 mg per tablet Take 1 Tablet by mouth every six (6) hours as needed for Pain for up to 3 days. Max Daily Amount: 4 Tablets. 12 Tablet 0       Past History     Past Medical History:  Past Medical History:   Diagnosis Date    Kidney stones        Past Surgical History:  Past Surgical History:   Procedure Laterality Date    HX APPENDECTOMY         Family History:  No family history on file.     Social History:  Social History     Tobacco Use    Smoking status: Former   Substance Use Topics    Alcohol use: No       Allergies:  No Known Allergies      Review of Systems   Review of Systems   Constitutional:  Positive for chills. Negative for fever. Respiratory:  Negative for chest tightness and shortness of breath. Cardiovascular:  Negative for chest pain and palpitations. Gastrointestinal:  Positive for abdominal pain, nausea and vomiting. Negative for abdominal distention, constipation and diarrhea. Genitourinary:  Negative for decreased urine volume, difficulty urinating, dysuria, flank pain, frequency, hematuria and urgency. Musculoskeletal:  Positive for back pain. Skin:  Negative for pallor. Neurological:  Negative for dizziness and light-headedness. Physical Exam     Vitals:    08/31/22 0953 08/31/22 1050 08/31/22 1051   BP: (!) 184/85  (!) 172/82   Pulse: (!) 107  88   Resp: 18     Temp: 97.1 °F (36.2 °C)     SpO2: 97% 96% 97%   Weight: 87.1 kg (192 lb)     Height: 5' 5\" (1.651 m)       Physical Exam  Vitals and nursing note reviewed. Constitutional:       General: She is not in acute distress. Appearance: Normal appearance. She is well-developed, well-groomed and normal weight. She is not ill-appearing. Comments: Uncomfortable appearing 67-year-old female. Vital signs show her blood pressure be elevated 184/85. In the room with family. HENT:      Mouth/Throat:      Mouth: Mucous membranes are moist.      Pharynx: Oropharynx is clear. Eyes:      General: No scleral icterus. Extraocular Movements: Extraocular movements intact. Conjunctiva/sclera: Conjunctivae normal.      Pupils: Pupils are equal, round, and reactive to light. Cardiovascular:      Rate and Rhythm: Normal rate and regular rhythm. Heart sounds: Normal heart sounds. Pulmonary:      Effort: Pulmonary effort is normal.      Breath sounds: Normal breath sounds. Abdominal:      General: Abdomen is flat. Bowel sounds are normal.      Palpations: Abdomen is soft.  There is no 1. The severity of signs/symptoms.(See ED/admit documents) hepatomegaly or splenomegaly. Tenderness: There is abdominal tenderness in the right upper quadrant and epigastric area. There is right CVA tenderness and guarding. There is no left CVA tenderness or rebound. Positive signs include Stout's sign. Negative signs include McBurney's sign. Hernia: No hernia is present. Comments: Increased tenderness palpation across the upper abdomen on the right side extending towards the epigastric region. Also some extension posteriorly on the right. Musculoskeletal:      Cervical back: Neck supple. Skin:     General: Skin is warm and dry. Coloration: Skin is not jaundiced. Neurological:      Mental Status: She is alert and oriented to person, place, and time. Psychiatric:         Mood and Affect: Mood normal.         Behavior: Behavior normal. Behavior is cooperative. Nursing note and vitals reviewed      Diagnostic Study Results     Labs -     No results found for this or any previous visit (from the past 24 hour(s)). Radiologic Studies   CT ABD PELV W CONT   Final Result      1. No clearly acute intra-abdominal or pelvic abnormality demonstrated. 2. Probable hepatic steatosis. US ABD LTD   Final Result      Hepatic steatosis and hepatomegaly. No focal hepatic lesion. No gallstones or secondary findings of cholecystitis. CT Results  (Last 48 hours)      None          CXR Results  (Last 48 hours)      None              Medical Decision Making   I am the first provider for this patient. I reviewed the vital signs, available nursing notes, past medical history, past surgical history, family history and social history. Vital Signs-Reviewed the patient's vital signs. Pulse Oximetry Analysis 97% on room air. Records Reviewed: Nursing Notes    DDX:  Right upper quadrant abdominal pain suspect biliary colic  History of kidney stones?   Stone  Inflammatory bowel, irritable bowel, colitis, diverticulitis    Procedures:  Procedures    ED Course:   Initial assessment performed. The patients presenting problems have been discussed, and they are in agreement with the care plan formulated and outlined with them. I have encouraged them to ask questions as they arise throughout their visit. ED Physician Discussion Note:   CBC negative  Urinalysis shows high specific gravity, greater than thousand glucose, trace ketones, positive nitrites, small leukocyte esterase, 4+ bacteria. Too numerous to count white blood cells. Culture was sent. Chemistry show elevated glucose 334. Otherwise normal.  Normal renal function  Mild elevations in liver function test ALT 61, AST 40, alkaline phosphatase 124  Lipase 89  Hemoglobin A1c ordered based off of elevated blood glucose with concern of diabetes  Ultrasound showed hepatomegaly and hepatic steatosis. No evidence of gallstones or cholecystitis  CT scan showed hepatic steatosis otherwise normal    Patient was advised that her results  IV fluids given as well as medication for pain  IV antibiotics ordered with concern of UTI possible pyelonephritis    Patient will be referred back to her primary care provider as well as to general surgery. She may need an outpatient HIDA scan just to make sure she does not have a gallbladder issue. Patient did admit to a strong family history of diabetes. Patient we placed on oral antibiotics to treat UTI concerns as well as medication for pain      I, Vandy Merlin PA-C, am the primary clinician on record for this patient. Diagnosis and Disposition       DISCHARGE NOTE:  Isidro Guerra  results have been reviewed with her. She has been counseled regarding her diagnosis, treatment, and plan. She verbally conveys understanding and agreement of the signs, symptoms, diagnosis, treatment and prognosis and additionally agrees to follow up as discussed.   She also agrees with the care-plan and conveys that all of her questions have been answered. I have also provided discharge instructions for her that include: educational information regarding their diagnosis and treatment, and list of reasons why they would want to return to the ED prior to their follow-up appointment, should her condition change. She has been provided with education for proper emergency department utilization. CLINICAL IMPRESSION:    1. Biliary colic    2. Urinary tract infection without hematuria, site unspecified    3. Hyperglycemia    4. Hepatic steatosis        PLAN:  1. D/C Home  2. Discharge Medication List as of 8/31/2022  1:24 PM        START taking these medications    Details   cephALEXin (Keflex) 500 mg capsule Take 1 Capsule by mouth three (3) times daily for 7 days. , Normal, Disp-21 Capsule, R-0      ketorolac (TORADOL) 10 mg tablet Take 1 Tablet by mouth every eight (8) hours as needed for Pain., Normal, Disp-15 Tablet, R-0      HYDROcodone-acetaminophen (Norco) 5-325 mg per tablet Take 1 Tablet by mouth every six (6) hours as needed for Pain for up to 3 days. Max Daily Amount: 4 Tablets., Normal, Disp-12 Tablet, R-0Dr. Miri Kat, ER Attending           3. Follow-up Information       Follow up With Specialties Details Why Contact Info    Primary care provider  Go to  Follow-up with your PCP regarding today's ER visit and to have your blood sugar rechecked/reevaluated     [facility] EMERGENCY DEPT Emergency Medicine  If symptoms worsen, As needed [address]  [phone]    Waylon Purvis MD General Surgery Call  Call general surgery for evaluation of possible gallbladder disease/elective cholecystectomy [address]  [phone]            ____________________________________     Please note that this dictation was completed with [employer], the computer voice recognition software.   Quite often unanticipated grammatical, syntax, homophones, and other interpretive errors are inadvertently transcribed by the computer software. Please disregard these errors. Please excuse any errors that have escaped final proofreading.

## 2022-10-03 ENCOUNTER — RESULT CHARGE (OUTPATIENT)
Age: 87
End: 2022-10-03

## 2022-10-04 ENCOUNTER — APPOINTMENT (OUTPATIENT)
Dept: CARDIOLOGY | Facility: CLINIC | Age: 87
End: 2022-10-04

## 2022-10-04 ENCOUNTER — NON-APPOINTMENT (OUTPATIENT)
Age: 87
End: 2022-10-04

## 2022-10-04 VITALS
WEIGHT: 175 LBS | BODY MASS INDEX: 25.92 KG/M2 | OXYGEN SATURATION: 97 % | HEART RATE: 48 BPM | DIASTOLIC BLOOD PRESSURE: 84 MMHG | TEMPERATURE: 97.1 F | HEIGHT: 69 IN | SYSTOLIC BLOOD PRESSURE: 136 MMHG

## 2022-10-04 PROCEDURE — 99215 OFFICE O/P EST HI 40 MIN: CPT

## 2022-10-04 RX ORDER — METOPROLOL SUCCINATE 100 MG/1
100 TABLET, EXTENDED RELEASE ORAL
Qty: 225 | Refills: 3 | Status: COMPLETED | COMMUNITY
Start: 2021-06-01 | End: 2022-10-04

## 2022-10-04 NOTE — PHYSICAL EXAM
[Well Developed] : well developed [Well Nourished] : well nourished [No Acute Distress] : no acute distress [Normal Venous Pressure] : normal venous pressure [No Carotid Bruit] : no carotid bruit [Normal S1, S2] : normal S1, S2 [No Murmur] : no murmur [No Rub] : no rub [No Gallop] : no gallop [Murmur] : murmur [Clear Lung Fields] : clear lung fields [Good Air Entry] : good air entry [No Respiratory Distress] : no respiratory distress  [Soft] : abdomen soft [Non Tender] : non-tender [No Masses/organomegaly] : no masses/organomegaly [Normal Bowel Sounds] : normal bowel sounds [Normal Gait] : normal gait [No Edema] : no edema [No Cyanosis] : no cyanosis [No Clubbing] : no clubbing [No Varicosities] : no varicosities [No Rash] : no rash [No Skin Lesions] : no skin lesions [Moves all extremities] : moves all extremities [No Focal Deficits] : no focal deficits [Normal Speech] : normal speech [Alert and Oriented] : alert and oriented [Normal memory] : normal memory [General Appearance - Well Developed] : well developed [Normal Appearance] : normal appearance [Well Groomed] : well groomed [General Appearance - Well Nourished] : well nourished [No Deformities] : no deformities [General Appearance - In No Acute Distress] : no acute distress [Normal Conjunctiva] : the conjunctiva exhibited no abnormalities [Eyelids - No Xanthelasma] : the eyelids demonstrated no xanthelasmas [Normal Oral Mucosa] : normal oral mucosa [No Oral Pallor] : no oral pallor [No Oral Cyanosis] : no oral cyanosis [Normal Jugular Venous A Waves Present] : normal jugular venous A waves present [Normal Jugular Venous V Waves Present] : normal jugular venous V waves present [No Jugular Venous Garcia A Waves] : no jugular venous garcia A waves [Respiration, Rhythm And Depth] : normal respiratory rhythm and effort [Exaggerated Use Of Accessory Muscles For Inspiration] : no accessory muscle use [Auscultation Breath Sounds / Voice Sounds] : lungs were clear to auscultation bilaterally [Heart Rate And Rhythm] : heart rate and rhythm were normal [Murmurs] : no murmurs present [Abdomen Soft] : soft [Abdomen Tenderness] : non-tender [Abdomen Mass (___ Cm)] : no abdominal mass palpated [Abnormal Walk] : normal gait [Gait - Sufficient For Exercise Testing] : the gait was sufficient for exercise testing [Nail Clubbing] : no clubbing of the fingernails [Cyanosis, Localized] : no localized cyanosis [Petechial Hemorrhages (___cm)] : no petechial hemorrhages [Skin Color & Pigmentation] : normal skin color and pigmentation [] : no rash [No Venous Stasis] : no venous stasis [Skin Lesions] : no skin lesions [No Skin Ulcers] : no skin ulcer [No Xanthoma] : no  xanthoma was observed [Oriented To Time, Place, And Person] : oriented to person, place, and time [Affect] : the affect was normal [Mood] : the mood was normal [No Anxiety] : not feeling anxious [de-identified] : 2/6 MARYLIN MR, trace bipedal edema [FreeTextEntry1] : irreg 3/6 MARYLIN MR

## 2022-10-04 NOTE — REASON FOR VISIT
[Other: ____] : [unfilled] [Consultation] : a consultation regarding [FreeTextEntry1] : Jeanmarie is a 87-year-old male with history of hypertension, asthma, severe MR, normal LVEF, recurrent rapid AF on Eliquis, amiodarone maintaining NSR, Toprol, Mitral Clip 9/4/19 Dr Elliott, postop echo LVEF 50-55% trace MR, erectile dysfunction on Viagra 25mg, COVID July full recovery\par \par Cardiovascular review of symptoms is negative for exertional chest pain, dyspnea, palpitations, dizziness or syncope.  No PND or orthopnea leg edema.  No bleeding or black stool.  Leg and scrotal edema had resolved after mitral Clip surgery.\par \par Patient is walking 15 minutes without exertional symptoms, weight and edema has been stable.\par \par  Average resting home BPs above guideline goal on Toprol Lasix.  Cozaar 25 mg daily added to the medical regimen with follow-up home BPs and repeat labs\par \par Patient had accelerated heart rates in [place] started on digoxin and metoprolol increased to 150 mg twice daily.  Patient had increasing pedal edema after driving home from [place] started on Lasix 20 mg daily with 17 pound weight loss. \par   \par Patient had sinus bradycardia with stable blood pressure, amiodarone was reduced from 200 mg daily to 100 mg daily Oct 2019.  Amiodarone discontinued Jan 2020,  3 months postop.  No need to continue long term aspirin  after mitral clip discussed with Dr Elliott. \par \par September 2020 patient had increasing leg edema and 8 pound weight gain patient started Lasix 20 mg daily and within 1 week lost 8 pounds with improvement in leg edema.\par Prior to MV clip patient had rapid AF in office 7/26/19 given intravenous Lopressor 5 mg x1 and Lopressor 25 mg orally with improvement in AF rate below 100 bpm, stable blood pressure 125/70\par \par Prior to MV clip patient had office evaluation PCP 7/16/19 recurrent chest pain abdominal pain found to be in new-onset rapid atrial fibrillation.  Patient started on Bystolic and Eliquis.  EKG 7/16/19 rapid AF 136bpm, RBBB, NSST.  EKG 7/17/19 sinus rhythm 66 bpm RBBB, LAFB, bifiaicular block\par \par Patient had recurrent atypical chest pain and abdominal pain symptoms July 2019 and  contrast CT abdomen negative for aneurysm. \par \par Echocardiogram June 2021 LVEF 45-50%, well-seated mitral valve clip with dual orifice, mild MR, LA 4.6 cm, moderate pHTN\par \par Echocardiogram 10/22/2020, LVEF 45%, mild LVH, double orifice mitral valve with well-seated mitral clip, mild AI, mild pulmonary hypertension, LA 5.2 cm\par \par Holter monitor December 2019 AF, flutter  average heart rate 77 bpm, rare to occasional PVCs, couplets, 3 beat NSVT, no symptoms in the diary\par \par EKG sinus bradycardia 53 bpm, RBBB, LAD\par \par Echocardiogram [facility] post Mitral Clip, LVEF 50-55%, trace MR\par \par Cardiac catheterization July 2019, LVEF 55%, nonobstructive coronaries, moderate severe MR\par \par Transesophageal echocardiogram July 2019, 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 Elective DC cardioversion performed patient started on amiodarone 200mg daily. \par  [FreeTextEntry2] : dependan leg edema, hx PAF on Eliquis, Toprol, off amiodarone, Mitral Clip 9/4/19 Dr Elliott for severe MR, HFpEF

## 2022-10-04 NOTE — DISCUSSION/SUMMARY
[FreeTextEntry1] : Jeanmarie is 87-year-old male with medical history detailed above and active medical issues including:\par \par -  HFpEF, edema resolved after Mitral Clip. Trace pedal edema on Lasix 20 mg daily, 17 pound weight loss May 2021, continue Lasix 20 mg every other day with follow-up home weights and BPs.  \par \par - Asymptomatic chronic AF on Eliquis, Toprol, off amiodarone.  Patient declines EP evaluation. \par \par - Multiple office evaluations with BENJY prior to MV clip.\par \par - Mitral Clip 9/4/19 Dr Elliott, postop echo LVEF 50-55 trace MR .  Completed aspirin 3 months postop  to allow endothelialization of mitral clip as per  Dr Elliott  \par \par - Patient has erectile dysfunction on Viagra 25 mg daily as needed ED\par \par - Hypertension.   Average resting home BPs above guideline goal on Toprol Lasix. Cozaar 25 mg daily added to the medical regimen with follow-up home BPs and repeat labs\par \par - History of asthma\par \par Patient educated on lifestyle and diet modification with low sodium low fat diet and avoidance of excessive alcohol. Patient is aware to call with any symptoms or concerns.  Recommended increased oral hydration with electrolyte suppliment drinks, avoid caffeine and alcohol intake. Limited lifting <50lbs working as a . \par \par Patient will be seen in cardiology followup 6 months.  Echocardiogram ordered to evaluate for structural heart disease, carotid and abdominal ultrasound to evaluate for PAD with telehealth follow-up.\par \par Patient will monitor home heart rate and blood pressures and daily weight.\par \par Followup with Dr Gurpreet Paulino for primary care. \par

## 2022-10-11 NOTE — ASU PREOP CHECKLIST - BOWEL PREP
n/a Missing Epidermis Histology Text: Missing tissue was noted on frozen sections and additional slides were cut until present.  If no additional tissue containing epidermis was available, then an additional stage was excised.

## 2022-10-26 ENCOUNTER — APPOINTMENT (OUTPATIENT)
Dept: CARDIOLOGY | Facility: CLINIC | Age: 87
End: 2022-10-26

## 2022-10-26 PROCEDURE — 93979 VASCULAR STUDY: CPT

## 2022-10-26 PROCEDURE — 93306 TTE W/DOPPLER COMPLETE: CPT

## 2022-10-26 PROCEDURE — 93880 EXTRACRANIAL BILAT STUDY: CPT

## 2022-10-26 NOTE — REASON FOR VISIT
[Other: ____] : [unfilled] [FreeTextEntry1] : Jeanmarie is a 87-year-old male with history of hypertension, asthma, severe MR, normal LVEF, recurrent rapid AF on Eliquis, amiodarone maintaining NSR, Toprol, Mitral Clip 9/4/19 Dr Elliott, postop echo LVEF 50-55% trace MR, erectile dysfunction on Viagra 25mg, COVID July full recovery\par \par Cardiovascular review of symptoms is negative for exertional chest pain, dyspnea, palpitations, dizziness or syncope.  No PND or orthopnea leg edema.  No bleeding or black stool.  Leg and scrotal edema had resolved after mitral Clip surgery.\par \par Patient is walking 15 minutes without exertional symptoms, weight and edema has been stable.\par \par  Average resting home BPs above guideline goal on Toprol Lasix.  Cozaar 25 mg daily added to the medical regimen with follow-up home BPs and repeat labs\par \par Patient had accelerated heart rates in [place] started on digoxin and metoprolol increased to 150 mg twice daily.  Patient had increasing pedal edema after driving home from [place] started on Lasix 20 mg daily with 17 pound weight loss. \par   \par Patient had sinus bradycardia with stable blood pressure, amiodarone was reduced from 200 mg daily to 100 mg daily Oct 2019.  Amiodarone discontinued Jan 2020,  3 months postop.  No need to continue long term aspirin  after mitral clip discussed with Dr Elliott. \par \par September 2020 patient had increasing leg edema and 8 pound weight gain patient started Lasix 20 mg daily and within 1 week lost 8 pounds with improvement in leg edema.\par Prior to MV clip patient had rapid AF in office 7/26/19 given intravenous Lopressor 5 mg x1 and Lopressor 25 mg orally with improvement in AF rate below 100 bpm, stable blood pressure 125/70\par \par Prior to MV clip patient had office evaluation PCP 7/16/19 recurrent chest pain abdominal pain found to be in new-onset rapid atrial fibrillation.  Patient started on Bystolic and Eliquis.  EKG 7/16/19 rapid AF 136bpm, RBBB, NSST.  EKG 7/17/19 sinus rhythm 66 bpm RBBB, LAFB, bifiaicular block\par \par Patient had recurrent atypical chest pain and abdominal pain symptoms July 2019 and  contrast CT abdomen negative for aneurysm. \par \par Echocardiogram June 2021 LVEF 45-50%, well-seated mitral valve clip with dual orifice, mild MR, LA 4.6 cm, moderate pHTN\par \par Echocardiogram 10/22/2020, LVEF 45%, mild LVH, double orifice mitral valve with well-seated mitral clip, mild AI, mild pulmonary hypertension, LA 5.2 cm\par \par Holter monitor December 2019 AF, flutter  average heart rate 77 bpm, rare to occasional PVCs, couplets, 3 beat NSVT, no symptoms in the diary\par \par EKG sinus bradycardia 53 bpm, RBBB, LAD\par \par Echocardiogram [facility] post Mitral Clip, LVEF 50-55%, trace MR\par \par Cardiac catheterization July 2019, LVEF 55%, nonobstructive coronaries, moderate severe MR\par \par Transesophageal echocardiogram July 2019, 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 Elective DC cardioversion performed patient started on amiodarone 200mg daily. \par  [Consultation] : a consultation regarding [FreeTextEntry2] : dependan leg edema, hx PAF on Eliquis, Toprol, off amiodarone, Mitral Clip 9/4/19 Dr Elliott for severe MR, HFpEF

## 2022-10-26 NOTE — PHYSICAL EXAM
[Well Developed] : well developed [Well Nourished] : well nourished [No Acute Distress] : no acute distress [Normal Venous Pressure] : normal venous pressure [No Carotid Bruit] : no carotid bruit [Normal S1, S2] : normal S1, S2 [No Murmur] : no murmur [No Rub] : no rub [No Gallop] : no gallop [Murmur] : murmur [Clear Lung Fields] : clear lung fields [Good Air Entry] : good air entry [No Respiratory Distress] : no respiratory distress  [Soft] : abdomen soft [Non Tender] : non-tender [No Masses/organomegaly] : no masses/organomegaly [Normal Bowel Sounds] : normal bowel sounds [Normal Gait] : normal gait [No Edema] : no edema [No Cyanosis] : no cyanosis [No Clubbing] : no clubbing [No Varicosities] : no varicosities [No Rash] : no rash [No Skin Lesions] : no skin lesions [Moves all extremities] : moves all extremities [No Focal Deficits] : no focal deficits [Normal Speech] : normal speech [Alert and Oriented] : alert and oriented [Normal memory] : normal memory [de-identified] : 2/6 MARYLIN MR, trace bipedal edema [General Appearance - Well Developed] : well developed [Normal Appearance] : normal appearance [Well Groomed] : well groomed [General Appearance - Well Nourished] : well nourished [No Deformities] : no deformities [General Appearance - In No Acute Distress] : no acute distress [Normal Conjunctiva] : the conjunctiva exhibited no abnormalities [Eyelids - No Xanthelasma] : the eyelids demonstrated no xanthelasmas [Normal Oral Mucosa] : normal oral mucosa [No Oral Pallor] : no oral pallor [No Oral Cyanosis] : no oral cyanosis [Normal Jugular Venous A Waves Present] : normal jugular venous A waves present [Normal Jugular Venous V Waves Present] : normal jugular venous V waves present [No Jugular Venous Garcia A Waves] : no jugular venous garcia A waves [Respiration, Rhythm And Depth] : normal respiratory rhythm and effort [Exaggerated Use Of Accessory Muscles For Inspiration] : no accessory muscle use [Auscultation Breath Sounds / Voice Sounds] : lungs were clear to auscultation bilaterally [Heart Rate And Rhythm] : heart rate and rhythm were normal [Murmurs] : no murmurs present [FreeTextEntry1] : irreg 3/6 MARYLIN MR [Abdomen Soft] : soft [Abdomen Tenderness] : non-tender [Abdomen Mass (___ Cm)] : no abdominal mass palpated [Abnormal Walk] : normal gait [Gait - Sufficient For Exercise Testing] : the gait was sufficient for exercise testing [Nail Clubbing] : no clubbing of the fingernails [Cyanosis, Localized] : no localized cyanosis [Petechial Hemorrhages (___cm)] : no petechial hemorrhages [Skin Color & Pigmentation] : normal skin color and pigmentation [] : no rash [No Venous Stasis] : no venous stasis [Skin Lesions] : no skin lesions [No Skin Ulcers] : no skin ulcer [No Xanthoma] : no  xanthoma was observed [Oriented To Time, Place, And Person] : oriented to person, place, and time [Affect] : the affect was normal [Mood] : the mood was normal [No Anxiety] : not feeling anxious

## 2022-10-31 ENCOUNTER — APPOINTMENT (OUTPATIENT)
Dept: CARDIOLOGY | Facility: CLINIC | Age: 87
End: 2022-10-31

## 2022-11-01 ENCOUNTER — APPOINTMENT (OUTPATIENT)
Dept: CARDIOLOGY | Facility: CLINIC | Age: 87
End: 2022-11-01

## 2022-11-01 DIAGNOSIS — I50.33 ACUTE ON CHRONIC DIASTOLIC (CONGESTIVE) HEART FAILURE: ICD-10-CM

## 2022-11-01 PROCEDURE — 99214 OFFICE O/P EST MOD 30 MIN: CPT | Mod: 95

## 2022-11-01 NOTE — DISCUSSION/SUMMARY
[FreeTextEntry1] : Jeanmarie is 87-year-old male with medical history detailed above and active medical issues including:\par \par -  HFpEF, edema resolved after Mitral Clip. Trace pedal edema on Lasix 20 mg daily, 17 pound weight loss May 2021, continue Lasix 20 mg every other day with follow-up home weights and BPs.  \par \par - Asymptomatic chronic AF on Eliquis, Toprol, off amiodarone.  Patient declines EP evaluation. \par \par - Multiple office evaluations with BENJY prior to MV clip.\par \par - Mitral Clip 9/4/19 Dr Elliott, postop echo LVEF 50-55 trace MR .  Completed aspirin 3 months postop  to allow endothelialization of mitral clip as per  Dr Elliott  \par \par - Patient has erectile dysfunction on Viagra 25 mg daily as needed ED\par \par - Hypertension.   Average resting home BPs above guideline goal on Toprol Lasix. Cozaar 25 mg daily added to the medical regimen with follow-up home BPs and repeat labs\par \par - History of asthma\par \par Patient educated on lifestyle and diet modification with low sodium low fat diet and avoidance of excessive alcohol. Patient is aware to call with any symptoms or concerns.  Recommended increased oral hydration with electrolyte suppliment drinks, avoid caffeine and alcohol intake. Limited lifting <50lbs working as a . \par \par Patient will be seen in cardiology followup 6 months.  Current cardiac medications remain unchanged and renewals  are up to date. Repeat labs will be ordered with PMD.\par \par Patient will monitor home heart rate and blood pressures and daily weight.\par \par Followup with Dr Gurpreet Paulino for primary care. \par

## 2022-11-01 NOTE — PHYSICAL EXAM
[Well Developed] : well developed [Well Nourished] : well nourished [No Acute Distress] : no acute distress [Normal Venous Pressure] : normal venous pressure [No Carotid Bruit] : no carotid bruit [Normal S1, S2] : normal S1, S2 [No Murmur] : no murmur [No Rub] : no rub [No Gallop] : no gallop [Murmur] : murmur [Clear Lung Fields] : clear lung fields [Good Air Entry] : good air entry [No Respiratory Distress] : no respiratory distress  [Soft] : abdomen soft [Non Tender] : non-tender [No Masses/organomegaly] : no masses/organomegaly [Normal Bowel Sounds] : normal bowel sounds [Normal Gait] : normal gait [No Edema] : no edema [No Cyanosis] : no cyanosis [No Clubbing] : no clubbing [No Varicosities] : no varicosities [No Rash] : no rash [No Skin Lesions] : no skin lesions [Moves all extremities] : moves all extremities [No Focal Deficits] : no focal deficits [Normal Speech] : normal speech [Alert and Oriented] : alert and oriented [Normal memory] : normal memory [General Appearance - Well Developed] : well developed [Normal Appearance] : normal appearance [Well Groomed] : well groomed [General Appearance - Well Nourished] : well nourished [No Deformities] : no deformities [General Appearance - In No Acute Distress] : no acute distress [Normal Conjunctiva] : the conjunctiva exhibited no abnormalities [Eyelids - No Xanthelasma] : the eyelids demonstrated no xanthelasmas [Normal Oral Mucosa] : normal oral mucosa [No Oral Pallor] : no oral pallor [No Oral Cyanosis] : no oral cyanosis [Normal Jugular Venous A Waves Present] : normal jugular venous A waves present [Normal Jugular Venous V Waves Present] : normal jugular venous V waves present [No Jugular Venous Garcia A Waves] : no jugular venous garcia A waves [Respiration, Rhythm And Depth] : normal respiratory rhythm and effort [Exaggerated Use Of Accessory Muscles For Inspiration] : no accessory muscle use [Auscultation Breath Sounds / Voice Sounds] : lungs were clear to auscultation bilaterally [Heart Rate And Rhythm] : heart rate and rhythm were normal [Murmurs] : no murmurs present [Abdomen Soft] : soft [Abdomen Tenderness] : non-tender [Abdomen Mass (___ Cm)] : no abdominal mass palpated [Abnormal Walk] : normal gait [Gait - Sufficient For Exercise Testing] : the gait was sufficient for exercise testing [Nail Clubbing] : no clubbing of the fingernails [Cyanosis, Localized] : no localized cyanosis [Petechial Hemorrhages (___cm)] : no petechial hemorrhages [Skin Color & Pigmentation] : normal skin color and pigmentation [] : no rash [No Venous Stasis] : no venous stasis [Skin Lesions] : no skin lesions [No Skin Ulcers] : no skin ulcer [No Xanthoma] : no  xanthoma was observed [Oriented To Time, Place, And Person] : oriented to person, place, and time [Mood] : the mood was normal [Affect] : the affect was normal [No Anxiety] : not feeling anxious [de-identified] : 2/6 MARYLIN MR, trace bipedal edema [FreeTextEntry1] : irreg 3/6 MARYLIN MR

## 2022-11-01 NOTE — REASON FOR VISIT
[Other: ____] : [unfilled] [Consultation] : a consultation regarding [FreeTextEntry1] : Jeanmarie is a 87-year-old male with history of hypertension, asthma, severe MR, normal LVEF, recurrent rapid AF on Eliquis, amiodarone maintaining NSR, Toprol, Mitral Clip 9/4/19 Dr Elliott, postop echo LVEF 50-55% trace MR, erectile dysfunction on Viagra 25mg, COVID July full recovery\par \par Cardiovascular review of symptoms is negative for exertional chest pain, dyspnea, palpitations, dizziness or syncope.  No PND or orthopnea leg edema.  No bleeding or black stool.  Leg and scrotal edema had resolved after mitral Clip surgery.\par \par Patient is walking 15 minutes without exertional symptoms, weight and edema has been stable.\par \par  Average resting home BPs above guideline goal on Toprol Lasix.  Cozaar 25 mg daily added to the medical regimen with follow-up home BPs and repeat labs\par \par Patient had accelerated heart rates in [place] started on digoxin and metoprolol increased to 150 mg twice daily.  Patient had increasing pedal edema after driving home from [place] started on Lasix 20 mg daily with 17 pound weight loss. \par   \par Patient had sinus bradycardia with stable blood pressure, amiodarone was reduced from 200 mg daily to 100 mg daily Oct 2019.  Amiodarone discontinued Jan 2020,  3 months postop.  No need to continue long term aspirin  after mitral clip discussed with Dr Elliott. \par \par September 2020 patient had increasing leg edema and 8 pound weight gain patient started Lasix 20 mg daily and within 1 week lost 8 pounds with improvement in leg edema.\par Prior to MV clip patient had rapid AF in office 7/26/19 given intravenous Lopressor 5 mg x1 and Lopressor 25 mg orally with improvement in AF rate below 100 bpm, stable blood pressure 125/70\par \par Prior to MV clip patient had office evaluation PCP 7/16/19 recurrent chest pain abdominal pain found to be in new-onset rapid atrial fibrillation.  Patient started on Bystolic and Eliquis.  EKG 7/16/19 rapid AF 136bpm, RBBB, NSST.  EKG 7/17/19 sinus rhythm 66 bpm RBBB, LAFB, bifiaicular block\par \par Patient had recurrent atypical chest pain and abdominal pain symptoms July 2019 and  contrast CT abdomen negative for aneurysm. \par \par Echocardiogram Oct 2022 LVEF 50-55%, moderate severe MR, MV clip seen, severe LAE, mild RVE.\par \par Echocardiogram June 2021 LVEF 45-50%, well-seated mitral valve clip with dual orifice, mild MR, LA 4.6 cm, moderate pHTN\par \par Echocardiogram 10/22/2020, LVEF 45%, mild LVH, double orifice mitral valve with well-seated mitral clip, mild AI, mild pulmonary hypertension, LA 5.2 cm\par \par Holter monitor December 2019 AF, flutter  average heart rate 77 bpm, rare to occasional PVCs, couplets, 3 beat NSVT, no symptoms in the diary\par \par EKG sinus bradycardia 53 bpm, RBBB, LAD\par \par Echocardiogram [facility] post Mitral Clip, LVEF 50-55%, trace MR\par \par Cardiac catheterization July 2019, LVEF 55%, nonobstructive coronaries, moderate severe MR\par \par Transesophageal echocardiogram July 2019, 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 Elective DC cardioversion performed patient started on amiodarone 200mg daily. \par  [FreeTextEntry2] : dependan leg edema, hx PAF on Eliquis, Toprol, off amiodarone, Mitral Clip 9/4/19 Dr Elliott for severe MR, HFpEF

## 2023-02-02 ENCOUNTER — NON-APPOINTMENT (OUTPATIENT)
Age: 88
End: 2023-02-02

## 2023-03-21 ENCOUNTER — RX RENEWAL (OUTPATIENT)
Age: 88
End: 2023-03-21

## 2023-03-28 RX ORDER — APIXABAN 5 MG/1
5 TABLET, FILM COATED ORAL
Qty: 180 | Refills: 3 | Status: ACTIVE | COMMUNITY
Start: 1900-01-01 | End: 1900-01-01

## 2023-05-17 PROBLEM — R10.13 ABDOMINAL PAIN, EPIGASTRIC: Status: ACTIVE | Noted: 2019-07-17

## 2023-05-17 PROBLEM — U09.9 POST-COVID SYNDROME: Status: ACTIVE | Noted: 2022-08-01

## 2023-05-24 ENCOUNTER — APPOINTMENT (OUTPATIENT)
Dept: CARDIOLOGY | Facility: CLINIC | Age: 88
End: 2023-05-24
Payer: MEDICARE

## 2023-05-24 DIAGNOSIS — R10.13 EPIGASTRIC PAIN: ICD-10-CM

## 2023-05-24 DIAGNOSIS — U09.9 POST COVID-19 CONDITION, UNSPECIFIED: ICD-10-CM

## 2023-05-24 PROCEDURE — 99215 OFFICE O/P EST HI 40 MIN: CPT

## 2023-05-24 RX ORDER — MUPIROCIN 20 MG/G
2 OINTMENT TOPICAL
Qty: 22 | Refills: 0 | Status: DISCONTINUED | COMMUNITY
Start: 2022-10-12 | End: 2023-05-24

## 2023-05-24 RX ORDER — FUROSEMIDE 20 MG/1
20 TABLET ORAL
Qty: 45 | Refills: 1 | Status: DISCONTINUED | COMMUNITY
End: 2023-05-24

## 2023-05-24 NOTE — PHYSICAL EXAM
[Well Developed] : well developed [Well Nourished] : well nourished [No Acute Distress] : no acute distress [Normal Venous Pressure] : normal venous pressure [No Carotid Bruit] : no carotid bruit [Normal S1, S2] : normal S1, S2 [No Murmur] : no murmur [No Rub] : no rub [No Gallop] : no gallop [Murmur] : murmur [Clear Lung Fields] : clear lung fields [Good Air Entry] : good air entry [No Respiratory Distress] : no respiratory distress  [Soft] : abdomen soft [Non Tender] : non-tender [No Masses/organomegaly] : no masses/organomegaly [Normal Bowel Sounds] : normal bowel sounds [Normal Gait] : normal gait [No Edema] : no edema [No Cyanosis] : no cyanosis [No Clubbing] : no clubbing [No Varicosities] : no varicosities [No Rash] : no rash [No Skin Lesions] : no skin lesions [Moves all extremities] : moves all extremities [No Focal Deficits] : no focal deficits [Normal Speech] : normal speech [Alert and Oriented] : alert and oriented [Normal memory] : normal memory [Normal Appearance] : normal appearance [General Appearance - Well Developed] : well developed [Well Groomed] : well groomed [General Appearance - Well Nourished] : well nourished [No Deformities] : no deformities [General Appearance - In No Acute Distress] : no acute distress [Normal Conjunctiva] : the conjunctiva exhibited no abnormalities [Eyelids - No Xanthelasma] : the eyelids demonstrated no xanthelasmas [Normal Oral Mucosa] : normal oral mucosa [No Oral Pallor] : no oral pallor [No Oral Cyanosis] : no oral cyanosis [Normal Jugular Venous A Waves Present] : normal jugular venous A waves present [Normal Jugular Venous V Waves Present] : normal jugular venous V waves present [No Jugular Venous Garcia A Waves] : no jugular venous garcia A waves [Respiration, Rhythm And Depth] : normal respiratory rhythm and effort [Exaggerated Use Of Accessory Muscles For Inspiration] : no accessory muscle use [Auscultation Breath Sounds / Voice Sounds] : lungs were clear to auscultation bilaterally [Heart Rate And Rhythm] : heart rate and rhythm were normal [Murmurs] : no murmurs present [Abdomen Soft] : soft [Abdomen Tenderness] : non-tender [Abdomen Mass (___ Cm)] : no abdominal mass palpated [Abnormal Walk] : normal gait [Gait - Sufficient For Exercise Testing] : the gait was sufficient for exercise testing [Nail Clubbing] : no clubbing of the fingernails [Cyanosis, Localized] : no localized cyanosis [Petechial Hemorrhages (___cm)] : no petechial hemorrhages [Skin Color & Pigmentation] : normal skin color and pigmentation [] : no rash [No Venous Stasis] : no venous stasis [Skin Lesions] : no skin lesions [No Skin Ulcers] : no skin ulcer [No Xanthoma] : no  xanthoma was observed [Oriented To Time, Place, And Person] : oriented to person, place, and time [Affect] : the affect was normal [Mood] : the mood was normal [No Anxiety] : not feeling anxious [de-identified] : 2/6 MARYLIN MR, trace bipedal edema [FreeTextEntry1] : irreg 3/6 MARYLIN MR

## 2023-05-24 NOTE — REASON FOR VISIT
[Other: ____] : [unfilled] [Consultation] : a consultation regarding [FreeTextEntry1] : Jeanmarie is a 88-year-old male with history of hypertension, asthma, severe MR, normal LVEF, recurrent rapid AF on Eliquis, amiodarone maintaining NSR, Toprol, Mitral Clip 9/4/19 Dr Elliott, postop echo LVEF 50-55% trace MR, erectile dysfunction on Viagra 25mg, COVID July full recovery\par \par Cardiovascular review of symptoms is negative for exertional chest pain, dyspnea, palpitations, dizziness or syncope.  No PND or orthopnea leg edema.  No bleeding or black stool.  Leg and scrotal edema had resolved after mitral Clip surgery.\par \par Patient is walking 15 minutes without exertional symptoms, weight and edema has been stable.  Patient had 25 pound weight loss over the past year on low calorie diet and reducing alcohol .\par \par  Average resting home BPs above guideline goal on Toprol Lasix.  Cozaar 25 mg daily added to the medical regimen with follow-up home BPs and repeat labs\par \par Patient had accelerated heart rates in [place] started on digoxin and metoprolol increased to 150 mg twice daily.  Patient had increasing pedal edema after driving home from [place] started on Lasix 20 mg daily with 17 pound weight loss. \par   \par Patient had sinus bradycardia with stable blood pressure, amiodarone was reduced from 200 mg daily to 100 mg daily Oct 2019.  Amiodarone discontinued Jan 2020,  3 months postop.  No need to continue long term aspirin  after mitral clip discussed with Dr Elliott. \par \par September 2020 patient had increasing leg edema and 8 pound weight gain patient started Lasix 20 mg daily and within 1 week lost 8 pounds with improvement in leg edema.\par Prior to MV clip patient had rapid AF in office 7/26/19 given intravenous Lopressor 5 mg x1 and Lopressor 25 mg orally with improvement in AF rate below 100 bpm, stable blood pressure 125/70\par \par Prior to MV clip patient had office evaluation PCP 7/16/19 recurrent chest pain abdominal pain found to be in new-onset rapid atrial fibrillation.  Patient started on Bystolic and Eliquis.  EKG 7/16/19 rapid AF 136bpm, RBBB, NSST.  EKG 7/17/19 sinus rhythm 66 bpm RBBB, LAFB, bifiaicular block\par \par Patient had recurrent atypical chest pain and abdominal pain symptoms July 2019 and  contrast CT abdomen negative for aneurysm. \par \par Echocardiogram Oct 2022 LVEF 50-55%, moderate severe MR, MV clip seen, severe LAE, mild RVE.\par \par Echocardiogram June 2021 LVEF 45-50%, well-seated mitral valve clip with dual orifice, mild MR, LA 4.6 cm, moderate pHTN\par \par Echocardiogram 10/22/2020, LVEF 45%, mild LVH, double orifice mitral valve with well-seated mitral clip, mild AI, mild pulmonary hypertension, LA 5.2 cm\par \par Holter monitor December 2019 AF, flutter  average heart rate 77 bpm, rare to occasional PVCs, couplets, 3 beat NSVT, no symptoms in the diary\par \par EKG sinus bradycardia 53 bpm, RBBB, LAD\par \par Echocardiogram [facility] post Mitral Clip, LVEF 50-55%, trace MR\par \par Cardiac catheterization July 2019, LVEF 55%, nonobstructive coronaries, moderate severe MR\par \par Transesophageal echocardiogram July 2019, 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 Elective DC cardioversion performed patient started on amiodarone 200mg daily. \par  [FreeTextEntry2] : dependan leg edema, hx PAF on Eliquis, Toprol, off amiodarone, Mitral Clip 9/4/19 Dr Elliott for severe MR, HFpEF

## 2023-05-24 NOTE — DISCUSSION/SUMMARY
[FreeTextEntry1] : Jeanmarie is 88-year-old male with medical history detailed above and active medical issues including:\par \par -  HFpEF, edema resolved after Mitral Clip. Trace pedal edema on Lasix 20 mg daily, prior 17 pound weight loss May 2021, continue Lasix 20 mg every other day with follow-up home weights and BPs.  \par \par - Asymptomatic chronic AF on Eliquis, Toprol, off amiodarone.  Patient declines EP evaluation. \par \par - Multiple office evaluations with BENJY prior to MV clip.\par \par - Mitral Clip 9/4/19 Dr Elliott, postop echo LVEF 50-55 trace MR .  Completed aspirin 3 months postop  to allow endothelialization of mitral clip as per  Dr Elliott  \par \par - Patient has erectile dysfunction on Viagra 25 mg daily as needed ED\par \par - Hypertension.   Average resting home BPs above guideline goal on Toprol Lasix. Cozaar 25 mg daily added to the medical regimen with follow-up home BPs and repeat labs\par \par - History of asthma\par \par -Borderline hyperlipidemia patient wishes to modify diet, repeat labs ordered\par \par Patient educated on lifestyle and diet modification with low sodium low fat diet and avoidance of excessive alcohol. Patient is aware to call with any symptoms or concerns.  Recommended increased oral hydration with electrolyte suppliment drinks, avoid caffeine and alcohol intake. Limited lifting <50lbs working as a . \par \par Patient will be seen in cardiology followup 6 months same-day echocardiogram and Doppler studies.  Current cardiac medications remain unchanged and renewals  are up to date. Repeat labs will be ordered with PMD.\par \par Patient will monitor home heart rate and blood pressures and daily weight.\par \par Followup with Dr Gurpreet Paulino for primary care. \par

## 2023-05-29 ENCOUNTER — RX RENEWAL (OUTPATIENT)
Age: 88
End: 2023-05-29

## 2023-06-01 ENCOUNTER — OFFICE (OUTPATIENT)
Dept: URBAN - METROPOLITAN AREA CLINIC 8 | Facility: CLINIC | Age: 88
Setting detail: OPHTHALMOLOGY
End: 2023-06-01
Payer: MEDICARE

## 2023-06-01 DIAGNOSIS — H02.831: ICD-10-CM

## 2023-06-01 DIAGNOSIS — H04.221: ICD-10-CM

## 2023-06-01 DIAGNOSIS — H02.834: ICD-10-CM

## 2023-06-01 DIAGNOSIS — H04.559: ICD-10-CM

## 2023-06-01 PROCEDURE — 99213 OFFICE O/P EST LOW 20 MIN: CPT | Performed by: OPHTHALMOLOGY

## 2023-06-01 ASSESSMENT — SPHEQUIV_DERIVED
OD_SPHEQUIV: 1.875
OD_SPHEQUIV: 1.875
OS_SPHEQUIV: 2.875
OS_SPHEQUIV: 2.75
OS_SPHEQUIV: 2.75
OD_SPHEQUIV: 2.125

## 2023-06-01 ASSESSMENT — KERATOMETRY
OS_K1POWER_DIOPTERS: 44.50
OD_K2POWER_DIOPTERS: 45.00
METHOD_AUTO_MANUAL: AUTO
OD_K1POWER_DIOPTERS: 44.00
OS_AXISANGLE_DEGREES: 118
OS_K2POWER_DIOPTERS: 45.00
OD_AXISANGLE_DEGREES: 084

## 2023-06-01 ASSESSMENT — REFRACTION_MANIFEST
OD_ADD: +3.00
OS_AXIS: 060
OS_CYLINDER: -1.75
OS_ADD: +3.00
OD_CYLINDER: -0.75
OS_ADD: +3.00
OD_SPHERE: +2.25
OS_VA2: 20/20(J1+)
OS_VA1: 20/25-1
OS_SPHERE: +3.75
OS_SPHERE: +3.50
OD_VA2: 20/20(J1+)
OD_CYLINDER: -0.75
OD_ADD: +3.00
OD_VA2: 20/20(J1+)
OS_CYLINDER: -1.50
OD_VA1: 20/25-2
OU_VA: 20/25-
OD_AXIS: 105
OD_VA1: 20/25-2
OS_VA1: 20/25-1
OS_VA2: 20/20(J1+)
OD_AXIS: 105
OD_SPHERE: +2.25
OU_VA: 20/25-
OS_AXIS: 060

## 2023-06-01 ASSESSMENT — REFRACTION_AUTOREFRACTION
OS_SPHERE: +3.50
OD_AXIS: 103
OS_CYLINDER: -1.50
OD_CYLINDER: -0.75
OS_AXIS: 059
OD_SPHERE: +2.50

## 2023-06-01 ASSESSMENT — VISUAL ACUITY
OD_BCVA: 20/30
OS_BCVA: 20/50

## 2023-06-01 ASSESSMENT — REFRACTION_CURRENTRX
OD_ADD: +3.00
OS_OVR_VA: 20/
OS_AXIS: 059
OS_SPHERE: +3.25
OD_OVR_VA: 20/
OD_AXIS: 105
OD_AXIS: 104
OS_AXIS: 059
OD_CYLINDER: -0.75
OD_SPHERE: +2.00
OD_CYLINDER: -0.50
OS_CYLINDER: -1.50
OS_ADD: +3.00
OD_OVR_VA: 20/
OD_ADD: +2.25
OS_VPRISM_DIRECTION: PROGS
OS_OVR_VA: 20/
OD_VPRISM_DIRECTION: PROGS
OS_SPHERE: +3.50
OS_CYLINDER: -1.25
OS_ADD: +2.25
OD_SPHERE: +2.25
OD_VPRISM_DIRECTION: PROGS
OS_VPRISM_DIRECTION: PROGS

## 2023-06-01 ASSESSMENT — LID POSITION - DERMATOCHALASIS
OD_DERMATOCHALASIS: RUL 2+ 3+
OS_DERMATOCHALASIS: LUL 2+ 3+

## 2023-06-01 ASSESSMENT — AXIALLENGTH_DERIVED
OD_AL: 22.5429
OS_AL: 22.1117
OD_AL: 22.5429
OS_AL: 22.1546
OD_AL: 22.4543
OS_AL: 22.1546

## 2023-06-01 ASSESSMENT — TEAR BREAK UP TIME (TBUT)
OS_TBUT: 6-8 SEC
OD_TBUT: 6-8 SEC

## 2023-06-01 ASSESSMENT — CORNEAL DYSTROPHY - POSTERIOR
OS_POSTERIORDYSTROPHY: 2+ GUTTATA
OD_POSTERIORDYSTROPHY: 2+ GUTTATA

## 2023-06-01 ASSESSMENT — DECREASING TEAR LAKE - SEVERITY SCORE
OS_DEC_TEARLAKE: 1+
OD_DEC_TEARLAKE: 1+

## 2023-06-01 ASSESSMENT — CONFRONTATIONAL VISUAL FIELD TEST (CVF)
OD_FINDINGS: FULL
OS_FINDINGS: FULL

## 2023-06-29 ENCOUNTER — OFFICE (OUTPATIENT)
Dept: URBAN - METROPOLITAN AREA CLINIC 8 | Facility: CLINIC | Age: 88
Setting detail: OPHTHALMOLOGY
End: 2023-06-29
Payer: MEDICARE

## 2023-06-29 DIAGNOSIS — H04.221: ICD-10-CM

## 2023-06-29 DIAGNOSIS — H04.559: ICD-10-CM

## 2023-06-29 PROBLEM — H02.824 LID LESION; LEFT UPPER LID: Status: ACTIVE | Noted: 2023-06-29

## 2023-06-29 PROCEDURE — 99213 OFFICE O/P EST LOW 20 MIN: CPT | Performed by: OPHTHALMOLOGY

## 2023-06-29 ASSESSMENT — REFRACTION_MANIFEST
OD_ADD: +3.00
OD_AXIS: 105
OS_ADD: +3.00
OS_AXIS: 060
OD_VA2: 20/20(J1+)
OS_CYLINDER: -1.50
OS_SPHERE: +3.50
OS_CYLINDER: -1.75
OS_VA1: 20/25-1
OD_VA1: 20/25-2
OD_CYLINDER: -0.75
OD_CYLINDER: -0.75
OU_VA: 20/25-
OS_VA2: 20/20(J1+)
OS_SPHERE: +3.75
OS_ADD: +3.00
OU_VA: 20/25-
OS_VA2: 20/20(J1+)
OD_AXIS: 105
OD_SPHERE: +2.25
OD_ADD: +3.00
OD_VA2: 20/20(J1+)
OS_AXIS: 060
OS_VA1: 20/25-1
OD_VA1: 20/25-2
OD_SPHERE: +2.25

## 2023-06-29 ASSESSMENT — REFRACTION_AUTOREFRACTION
OS_SPHERE: +3.50
OD_AXIS: 103
OS_CYLINDER: -1.50
OD_SPHERE: +2.50
OD_CYLINDER: -0.75
OS_AXIS: 059

## 2023-06-29 ASSESSMENT — CORNEAL DYSTROPHY - POSTERIOR
OD_POSTERIORDYSTROPHY: 2+ GUTTATA
OS_POSTERIORDYSTROPHY: 2+ GUTTATA

## 2023-06-29 ASSESSMENT — REFRACTION_CURRENTRX
OD_CYLINDER: -0.75
OS_ADD: +3.00
OS_OVR_VA: 20/
OD_OVR_VA: 20/
OD_ADD: +3.00
OD_VPRISM_DIRECTION: PROGS
OS_VPRISM_DIRECTION: PROGS
OD_SPHERE: +2.25
OD_SPHERE: +2.00
OS_VPRISM_DIRECTION: PROGS
OS_CYLINDER: -1.25
OS_SPHERE: +3.25
OD_OVR_VA: 20/
OD_CYLINDER: -0.50
OS_AXIS: 059
OD_VPRISM_DIRECTION: PROGS
OS_SPHERE: +3.50
OD_ADD: +2.25
OS_AXIS: 059
OS_ADD: +2.25
OD_AXIS: 104
OD_AXIS: 105
OS_CYLINDER: -1.50
OS_OVR_VA: 20/

## 2023-06-29 ASSESSMENT — KERATOMETRY
OD_K1POWER_DIOPTERS: 44.00
METHOD_AUTO_MANUAL: AUTO
OS_AXISANGLE_DEGREES: 118
OD_AXISANGLE_DEGREES: 084
OD_K2POWER_DIOPTERS: 45.00
OS_K2POWER_DIOPTERS: 45.00
OS_K1POWER_DIOPTERS: 44.50

## 2023-06-29 ASSESSMENT — SPHEQUIV_DERIVED
OS_SPHEQUIV: 2.875
OD_SPHEQUIV: 2.125
OS_SPHEQUIV: 2.75
OD_SPHEQUIV: 1.875
OD_SPHEQUIV: 1.875
OS_SPHEQUIV: 2.75

## 2023-06-29 ASSESSMENT — LID POSITION - DERMATOCHALASIS
OD_DERMATOCHALASIS: RUL 2+ 3+
OS_DERMATOCHALASIS: LUL 2+ 3+

## 2023-06-29 ASSESSMENT — TEAR BREAK UP TIME (TBUT)
OD_TBUT: 6-8 SEC
OS_TBUT: 6-8 SEC

## 2023-06-29 ASSESSMENT — CONFRONTATIONAL VISUAL FIELD TEST (CVF)
OS_FINDINGS: FULL
OD_FINDINGS: FULL

## 2023-06-29 ASSESSMENT — DECREASING TEAR LAKE - SEVERITY SCORE
OS_DEC_TEARLAKE: 1+
OD_DEC_TEARLAKE: 1+

## 2023-06-29 ASSESSMENT — AXIALLENGTH_DERIVED
OD_AL: 22.4543
OD_AL: 22.5429
OS_AL: 22.1546
OD_AL: 22.5429
OS_AL: 22.1546
OS_AL: 22.1117

## 2023-06-29 ASSESSMENT — TONOMETRY
OS_IOP_MMHG: 14
OD_IOP_MMHG: 14

## 2023-06-29 ASSESSMENT — VISUAL ACUITY
OD_BCVA: 20/30
OS_BCVA: 20/50

## 2023-10-05 PROBLEM — Z98.890 STATUS POST IMPLANTATION OF MITRAL VALVE LEAFLET CLIP: Status: ACTIVE | Noted: 2019-09-18

## 2023-10-05 PROBLEM — R51.9 HEADACHE: Status: ACTIVE | Noted: 2022-06-30

## 2023-10-05 PROBLEM — I50.32 CHRONIC DIASTOLIC CONGESTIVE HEART FAILURE: Status: ACTIVE | Noted: 2019-12-26

## 2023-10-05 PROBLEM — N52.35 ERECTILE DYSFUNCTION FOLLOWING RADIATION THERAPY: Status: ACTIVE | Noted: 2019-10-10

## 2023-10-06 ENCOUNTER — OFFICE (OUTPATIENT)
Dept: URBAN - METROPOLITAN AREA CLINIC 8 | Facility: CLINIC | Age: 88
Setting detail: OPHTHALMOLOGY
End: 2023-10-06
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
## 2023-10-06 PROCEDURE — NO SHOW FE NO SHOW FEE: Performed by: OPHTHALMOLOGY

## 2023-10-10 ENCOUNTER — APPOINTMENT (OUTPATIENT)
Dept: CARDIOLOGY | Facility: CLINIC | Age: 88
End: 2023-10-10

## 2023-10-10 DIAGNOSIS — Z95.818 OTHER SPECIFIED POSTPROCEDURAL STATES: ICD-10-CM

## 2023-10-10 DIAGNOSIS — I50.32 CHRONIC DIASTOLIC (CONGESTIVE) HEART FAILURE: ICD-10-CM

## 2023-10-10 DIAGNOSIS — R51.9 HEADACHE, UNSPECIFIED: ICD-10-CM

## 2023-10-10 DIAGNOSIS — N52.35 ERECTILE DYSFUNCTION FOLLOWING RADIATION THERAPY: ICD-10-CM

## 2023-10-10 DIAGNOSIS — Z98.890 OTHER SPECIFIED POSTPROCEDURAL STATES: ICD-10-CM

## 2023-10-13 RX ORDER — AZITHROMYCIN 500 MG/1
500 TABLET, FILM COATED ORAL
Qty: 1 | Refills: 1 | Status: ACTIVE | COMMUNITY
Start: 1900-01-01 | End: 1900-01-01

## 2023-10-13 RX ORDER — DIGOXIN 125 UG/1
125 TABLET ORAL
Qty: 90 | Refills: 1 | Status: ACTIVE | COMMUNITY
Start: 2021-06-21 | End: 1900-01-01

## 2024-03-11 ENCOUNTER — RX RENEWAL (OUTPATIENT)
Age: 89
End: 2024-03-11

## 2024-03-11 DIAGNOSIS — I48.91 UNSPECIFIED ATRIAL FIBRILLATION: ICD-10-CM

## 2024-03-11 RX ORDER — LOSARTAN POTASSIUM 25 MG/1
25 TABLET, FILM COATED ORAL DAILY
Qty: 90 | Refills: 1 | Status: ACTIVE | COMMUNITY
Start: 2021-10-14 | End: 1900-01-01

## 2024-03-28 ENCOUNTER — RX RENEWAL (OUTPATIENT)
Age: 89
End: 2024-03-28

## 2024-04-03 DIAGNOSIS — I10 ESSENTIAL (PRIMARY) HYPERTENSION: ICD-10-CM

## 2024-04-03 DIAGNOSIS — E78.2 MIXED HYPERLIPIDEMIA: ICD-10-CM

## 2024-04-03 RX ORDER — METOPROLOL SUCCINATE 100 MG/1
100 TABLET, EXTENDED RELEASE ORAL DAILY
Qty: 180 | Refills: 0 | Status: ACTIVE | COMMUNITY
Start: 1900-01-01 | End: 1900-01-01

## 2024-07-24 ENCOUNTER — APPOINTMENT (OUTPATIENT)
Dept: CARDIOLOGY | Facility: CLINIC | Age: 89
End: 2024-07-24
Payer: MEDICARE

## 2024-07-24 VITALS
WEIGHT: 165 LBS | OXYGEN SATURATION: 96 % | BODY MASS INDEX: 24.44 KG/M2 | SYSTOLIC BLOOD PRESSURE: 106 MMHG | DIASTOLIC BLOOD PRESSURE: 60 MMHG | HEIGHT: 69 IN | HEART RATE: 81 BPM

## 2024-07-24 DIAGNOSIS — L03.90 CELLULITIS, UNSPECIFIED: ICD-10-CM

## 2024-07-24 DIAGNOSIS — U09.9 POST COVID-19 CONDITION, UNSPECIFIED: ICD-10-CM

## 2024-07-24 DIAGNOSIS — I10 ESSENTIAL (PRIMARY) HYPERTENSION: ICD-10-CM

## 2024-07-24 DIAGNOSIS — Z98.890 OTHER SPECIFIED POSTPROCEDURAL STATES: ICD-10-CM

## 2024-07-24 DIAGNOSIS — Z95.818 OTHER SPECIFIED POSTPROCEDURAL STATES: ICD-10-CM

## 2024-07-24 DIAGNOSIS — R10.13 EPIGASTRIC PAIN: ICD-10-CM

## 2024-07-24 DIAGNOSIS — I48.91 UNSPECIFIED ATRIAL FIBRILLATION: ICD-10-CM

## 2024-07-24 DIAGNOSIS — E78.2 MIXED HYPERLIPIDEMIA: ICD-10-CM

## 2024-07-24 PROCEDURE — 93242 EXT ECG>48HR<7D RECORDING: CPT

## 2024-07-24 PROCEDURE — 99215 OFFICE O/P EST HI 40 MIN: CPT

## 2024-07-24 PROCEDURE — G2211 COMPLEX E/M VISIT ADD ON: CPT

## 2024-07-24 RX ORDER — FUROSEMIDE 80 MG/1
TABLET ORAL TWICE DAILY
Refills: 0 | Status: ACTIVE | COMMUNITY

## 2024-07-24 NOTE — DISCUSSION/SUMMARY
[FreeTextEntry1] : Patient has medical history detailed above and active medical issues including:  -  Hospital follow-up admission in [facility] May 2024 for leg edema cellulitis, HFpEF., hypotension.  Discharged home on Toprol, furosemide, improved pedal edema, cellulitis remains.  Patient referred to wound care clinic at [facility].   -  HFpEF, edema resolved after Mitral Clip. Trace pedal edema on Lasix 20 mg daily, prior 17 pound weight loss May 2021 diuresis with lasix  - Asymptomatic chronic AF on Eliquis, Toprol, off amiodarone.  Patient declines EP evaluation.   - Multiple office evaluations with BENJY prior to MV clip.  Zio patch 1 week heart monitor started today, repeat labs ordered.  - Mitral Clip 9/4/19 Dr Elliott, postop echo LVEF 50-55 trace MR .  Completed aspirin 3 months postop  to allow endothelialization of mitral clip as per  Dr Elliott    - Patient has erectile dysfunction on Viagra 25 mg daily as needed ED  - Hypertension.   Average resting home BPs at guideline goal on Toprol Lasix.  Continue to follow average resting home BPs prior to dose.  - History of asthma  - Borderline hyperlipidemia patient wishes to modify diet, repeat labs ordered  Patient educated on lifestyle and diet modification with low sodium low fat diet and avoidance of excessive alcohol. Patient is aware to call with any symptoms or concerns.  Recommended increased oral hydration with electrolyte suppliment drinks, avoid caffeine and alcohol intake. Limited lifting <50lbs working as a .   Patient will be seen in cardiology followup same-day echocardiogram.  Current cardiac medications remain unchanged and renewals  are up to date. Repeat labs will be ordered with PMD.  Patient will monitor home heart rate and blood pressures and daily weight.  Followup with Dr Gurpreet Paulino for primary care.   Total time spent 45 minutes, reviewing of test results, chart information, patient discussion, physical exam and completion of chart documentation.

## 2024-07-24 NOTE — DISCUSSION/SUMMARY
[FreeTextEntry1] : Patient has medical history detailed above and active medical issues including:  -  Hospital follow-up admission in [facility] May 2024 for leg edema cellulitis, HFpEF., hypotension.  Discharged home on Toprol, furosemide, improved pedal edema, cellulitis remains.  Patient referred to wound care clinic at [facility].   -  HFpEF, edema resolved after Mitral Clip. Trace pedal edema on Lasix 20 mg daily, prior 17 pound weight loss May 2021 diuresis with lasix  - Asymptomatic chronic AF on Eliquis, Toprol, off amiodarone.  Patient declines EP evaluation.   - Multiple office evaluations with BENJY prior to MV clip.  Zio patch 1 week heart monitor started today, repeat labs ordered.  - Mitral Clip 9/4/19 Dr Elliott, postop echo LVEF 50-55 trace MR .  Completed aspirin 3 months postop  to allow endothelialization of mitral clip as per  Dr Elliott    - Patient has erectile dysfunction on Viagra 25 mg daily as needed ED  - Hypertension.   Average resting home BPs at guideline goal on Toprol Lasix.  Continue to follow average resting home BPs prior to dose.  - History of asthma  - Borderline hyperlipidemia patient wishes to modify diet, repeat labs ordered  Patient educated on lifestyle and diet modification with low sodium low fat diet and avoidance of excessive alcohol. Patient is aware to call with any symptoms or concerns.  Recommended increased oral hydration with electrolyte suppliment drinks, avoid caffeine and alcohol intake. Limited lifting <50lbs working as a .   Patient will be seen in cardiology followup same-day echocardiogram.  Current cardiac medications remain unchanged and renewals  are up to date. Repeat labs will be ordered with PMD.  Patient will monitor home heart rate and blood pressures and daily weight.  Followup with Dr Gurpreet Paulino for primary care.   Total time spent 45 minutes, reviewing of test results, chart information, patient discussion, physical exam and completion of chart documentation.

## 2024-07-24 NOTE — REASON FOR VISIT
[Other: ____] : [unfilled] [Consultation] : a consultation regarding [FreeTextEntry1] : Jeanmarie has a past medical history of hypertension, asthma, severe MR, normal LVEF, recurrent rapid AF on Eliquis, amiodarone maintaining NSR, Toprol, Mitral Clip 9/4/19 Dr Elliott, postop echo LVEF 50-55% trace MR, erectile dysfunction on Viagra 25mg, COVID July full recovery  Cardiovascular review of symptoms is negative for exertional chest pain, dyspnea, palpitations, dizziness or syncope.  No PND or orthopnea leg edema.  No bleeding or black stool.  Leg and scrotal edema had resolved after mitral Clip surgery.  Patient is walking 15 minutes without exertional symptoms, weight and edema has been stable.  Patient had 25 pound weight loss over the past year on low calorie diet and reducing alcohol .   Average resting home BPs above guideline goal on Toprol Lasix.  Cozaar 25 mg daily added to the medical regimen with follow-up home BPs and repeat labs  Patient had accelerated heart rates in [place] started on digoxin and metoprolol increased to 150 mg twice daily.  Patient had increasing pedal edema after driving home from [place] started on Lasix 20 mg daily with 17 pound weight loss.     Patient had sinus bradycardia with stable blood pressure, amiodarone was reduced from 200 mg daily to 100 mg daily Oct 2019.  Amiodarone discontinued Jan 2020,  3 months postop.  No need to continue long term aspirin  after mitral clip discussed with Dr Elliott.   September 2020 patient had increasing leg edema and 8 pound weight gain patient started Lasix 20 mg daily and within 1 week lost 8 pounds with improvement in leg edema. Prior to MV clip patient had rapid AF in office 7/26/19 given intravenous Lopressor 5 mg x1 and Lopressor 25 mg orally with improvement in AF rate below 100 bpm, stable blood pressure 125/70  Prior to MV clip patient had office evaluation PCP 7/16/19 recurrent chest pain abdominal pain found to be in new-onset rapid atrial fibrillation.  Patient started on Bystolic and Eliquis.  EKG 7/16/19 rapid AF 136bpm, RBBB, NSST.  EKG 7/17/19 sinus rhythm 66 bpm RBBB, LAFB, bifiaicular block  Patient had recurrent atypical chest pain and abdominal pain symptoms July 2019 and  contrast CT abdomen negative for aneurysm.   Echocardiogram Oct 2022 LVEF 50-55%, moderate severe MR, MV clip seen, severe LAE, mild RVE.  Echocardiogram June 2021 LVEF 45-50%, well-seated mitral valve clip with dual orifice, mild MR, LA 4.6 cm, moderate pHTN  Echocardiogram 10/22/2020, LVEF 45%, mild LVH, double orifice mitral valve with well-seated mitral clip, mild AI, mild pulmonary hypertension, LA 5.2 cm  Holter monitor December 2019 AF, flutter  average heart rate 77 bpm, rare to occasional PVCs, couplets, 3 beat NSVT, no symptoms in the diary  EKG sinus bradycardia 53 bpm, RBBB, LAD  Echocardiogram [facility] post Mitral Clip, LVEF 50-55%, trace MR  Cardiac catheterization July 2019, LVEF 55%, nonobstructive coronaries, moderate severe MR  Transesophageal echocardiogram July 2019, 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 Elective DC cardioversion performed patient started on amiodarone 200mg daily.   [FreeTextEntry2] : dependan leg edema, hx PAF on Eliquis, Toprol, off amiodarone, Mitral Clip 9/4/19 Dr Elliott for severe MR, HFpEF

## 2024-07-24 NOTE — PHYSICAL EXAM
[Well Developed] : well developed [Well Nourished] : well nourished [No Acute Distress] : no acute distress [Normal Venous Pressure] : normal venous pressure [No Carotid Bruit] : no carotid bruit [Normal S1, S2] : normal S1, S2 [No Murmur] : no murmur [No Rub] : no rub [No Gallop] : no gallop [Murmur] : murmur [Clear Lung Fields] : clear lung fields [Good Air Entry] : good air entry [No Respiratory Distress] : no respiratory distress  [Soft] : abdomen soft [Non Tender] : non-tender [No Masses/organomegaly] : no masses/organomegaly [Normal Bowel Sounds] : normal bowel sounds [Normal Gait] : normal gait [No Edema] : no edema [No Cyanosis] : no cyanosis [No Clubbing] : no clubbing [No Varicosities] : no varicosities [No Rash] : no rash [No Skin Lesions] : no skin lesions [Moves all extremities] : moves all extremities [No Focal Deficits] : no focal deficits [Normal Speech] : normal speech [Alert and Oriented] : alert and oriented [Normal memory] : normal memory [General Appearance - Well Developed] : well developed [Normal Appearance] : normal appearance [Well Groomed] : well groomed [General Appearance - Well Nourished] : well nourished [No Deformities] : no deformities [General Appearance - In No Acute Distress] : no acute distress [Normal Conjunctiva] : the conjunctiva exhibited no abnormalities [Eyelids - No Xanthelasma] : the eyelids demonstrated no xanthelasmas [Normal Oral Mucosa] : normal oral mucosa [No Oral Pallor] : no oral pallor [No Oral Cyanosis] : no oral cyanosis [Normal Jugular Venous A Waves Present] : normal jugular venous A waves present [Normal Jugular Venous V Waves Present] : normal jugular venous V waves present [No Jugular Venous Garcia A Waves] : no jugular venous garcia A waves [Respiration, Rhythm And Depth] : normal respiratory rhythm and effort [Exaggerated Use Of Accessory Muscles For Inspiration] : no accessory muscle use [Auscultation Breath Sounds / Voice Sounds] : lungs were clear to auscultation bilaterally [Heart Rate And Rhythm] : heart rate and rhythm were normal [Murmurs] : no murmurs present [Abdomen Soft] : soft [Abdomen Tenderness] : non-tender [Abdomen Mass (___ Cm)] : no abdominal mass palpated [Abnormal Walk] : normal gait [Gait - Sufficient For Exercise Testing] : the gait was sufficient for exercise testing [Cyanosis, Localized] : no localized cyanosis [Nail Clubbing] : no clubbing of the fingernails [Petechial Hemorrhages (___cm)] : no petechial hemorrhages [Skin Color & Pigmentation] : normal skin color and pigmentation [] : no rash [No Venous Stasis] : no venous stasis [No Skin Ulcers] : no skin ulcer [Skin Lesions] : no skin lesions [No Xanthoma] : no  xanthoma was observed [Oriented To Time, Place, And Person] : oriented to person, place, and time [Affect] : the affect was normal [Mood] : the mood was normal [No Anxiety] : not feeling anxious [de-identified] : 2/6 MARYLIN MR, trace bipedal edema [FreeTextEntry1] : irreg 3/6 MARYLIN MR

## 2024-07-24 NOTE — PHYSICAL EXAM
[Well Developed] : well developed [Well Nourished] : well nourished [No Acute Distress] : no acute distress [Normal Venous Pressure] : normal venous pressure [No Carotid Bruit] : no carotid bruit [Normal S1, S2] : normal S1, S2 [No Murmur] : no murmur [No Rub] : no rub [No Gallop] : no gallop [Murmur] : murmur [Clear Lung Fields] : clear lung fields [Good Air Entry] : good air entry [No Respiratory Distress] : no respiratory distress  [Soft] : abdomen soft [Non Tender] : non-tender [No Masses/organomegaly] : no masses/organomegaly [Normal Bowel Sounds] : normal bowel sounds [Normal Gait] : normal gait [No Edema] : no edema [No Cyanosis] : no cyanosis [No Clubbing] : no clubbing [No Varicosities] : no varicosities [No Rash] : no rash [No Skin Lesions] : no skin lesions [Moves all extremities] : moves all extremities [No Focal Deficits] : no focal deficits [Normal Speech] : normal speech [Alert and Oriented] : alert and oriented [Normal memory] : normal memory [General Appearance - Well Developed] : well developed [Normal Appearance] : normal appearance [Well Groomed] : well groomed [General Appearance - Well Nourished] : well nourished [No Deformities] : no deformities [General Appearance - In No Acute Distress] : no acute distress [Normal Conjunctiva] : the conjunctiva exhibited no abnormalities [Eyelids - No Xanthelasma] : the eyelids demonstrated no xanthelasmas [Normal Oral Mucosa] : normal oral mucosa [No Oral Pallor] : no oral pallor [No Oral Cyanosis] : no oral cyanosis [Normal Jugular Venous A Waves Present] : normal jugular venous A waves present [Normal Jugular Venous V Waves Present] : normal jugular venous V waves present [No Jugular Venous Garcia A Waves] : no jugular venous garcia A waves [Respiration, Rhythm And Depth] : normal respiratory rhythm and effort [Exaggerated Use Of Accessory Muscles For Inspiration] : no accessory muscle use [Auscultation Breath Sounds / Voice Sounds] : lungs were clear to auscultation bilaterally [Heart Rate And Rhythm] : heart rate and rhythm were normal [Murmurs] : no murmurs present [Abdomen Soft] : soft [Abdomen Tenderness] : non-tender [Abdomen Mass (___ Cm)] : no abdominal mass palpated [Abnormal Walk] : normal gait [Gait - Sufficient For Exercise Testing] : the gait was sufficient for exercise testing [Nail Clubbing] : no clubbing of the fingernails [Cyanosis, Localized] : no localized cyanosis [Petechial Hemorrhages (___cm)] : no petechial hemorrhages [Skin Color & Pigmentation] : normal skin color and pigmentation [] : no rash [No Venous Stasis] : no venous stasis [No Skin Ulcers] : no skin ulcer [Skin Lesions] : no skin lesions [No Xanthoma] : no  xanthoma was observed [Oriented To Time, Place, And Person] : oriented to person, place, and time [Affect] : the affect was normal [Mood] : the mood was normal [No Anxiety] : not feeling anxious [de-identified] : 2/6 MARYLIN MR, trace bipedal edema [FreeTextEntry1] : irreg 3/6 MARYLIN MR

## 2024-07-24 NOTE — REASON FOR VISIT
[Other: ____] : [unfilled] [Consultation] : a consultation regarding [FreeTextEntry1] : Jeanmarie has a past medical history of hypertension, asthma, severe MR, normal LVEF, recurrent rapid AF on Eliquis, amiodarone maintaining NSR, Toprol, Mitral Clip 9/4/19 Dr Elliott, postop echo LVEF 50-55% trace MR, erectile dysfunction on Viagra 25mg, COVID July full recovery  Cardiovascular review of symptoms is negative for exertional chest pain, dyspnea, palpitations, dizziness or syncope.  No PND or orthopnea leg edema.  No bleeding or black stool.  Leg and scrotal edema had resolved after mitral Clip surgery.  Patient is walking 15 minutes without exertional symptoms, weight and edema has been stable.  Patient had 25 pound weight loss over the past year on low calorie diet and reducing alcohol .   Average resting home BPs above guideline goal on Toprol Lasix.  Cozaar 25 mg daily added to the medical regimen with follow-up home BPs and repeat labs  Patient had accelerated heart rates in [place] started on digoxin and metoprolol increased to 150 mg twice daily.  Patient had increasing pedal edema after driving home from [place] started on Lasix 20 mg daily with 17 pound weight loss.     Patient had sinus bradycardia with stable blood pressure, amiodarone was reduced from 200 mg daily to 100 mg daily Oct 2019.  Amiodarone discontinued Jan 2020,  3 months postop.  No need to continue long term aspirin  after mitral clip discussed with Dr Elliott.   September 2020 patient had increasing leg edema and 8 pound weight gain patient started Lasix 20 mg daily and within 1 week lost 8 pounds with improvement in leg edema. Prior to MV clip patient had rapid AF in office 7/26/19 given intravenous Lopressor 5 mg x1 and Lopressor 25 mg orally with improvement in AF rate below 100 bpm, stable blood pressure 125/70  Prior to MV clip patient had office evaluation PCP 7/16/19 recurrent chest pain abdominal pain found to be in new-onset rapid atrial fibrillation.  Patient started on Bystolic and Eliquis.  EKG 7/16/19 rapid AF 136bpm, RBBB, NSST.  EKG 7/17/19 sinus rhythm 66 bpm RBBB, LAFB, bifiaicular block  Patient had recurrent atypical chest pain and abdominal pain symptoms July 2019 and  contrast CT abdomen negative for aneurysm.   Echocardiogram Oct 2022 LVEF 50-55%, moderate severe MR, MV clip seen, severe LAE, mild RVE.  Echocardiogram June 2021 LVEF 45-50%, well-seated mitral valve clip with dual orifice, mild MR, LA 4.6 cm, moderate pHTN  Echocardiogram 10/22/2020, LVEF 45%, mild LVH, double orifice mitral valve with well-seated mitral clip, mild AI, mild pulmonary hypertension, LA 5.2 cm  Holter monitor December 2019 AF, flutter  average heart rate 77 bpm, rare to occasional PVCs, couplets, 3 beat NSVT, no symptoms in the diary  EKG sinus bradycardia 53 bpm, RBBB, LAD  Echocardiogram [facility] post Mitral Clip, LVEF 50-55%, trace MR  Cardiac catheterization July 2019, LVEF 55%, nonobstructive coronaries, moderate severe MR  Transesophageal echocardiogram July 2019, 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 Elective DC cardioversion performed patient started on amiodarone 200mg daily.   [FreeTextEntry2] : dependan leg edema, hx PAF on Eliquis, Toprol, off amiodarone, Mitral Clip 9/4/19 Dr Elliott for severe MR, HFpEF

## 2024-08-08 PROCEDURE — 93244 EXT ECG>48HR<7D REV&INTERPJ: CPT

## 2024-08-21 ENCOUNTER — APPOINTMENT (OUTPATIENT)
Dept: CARDIOLOGY | Facility: CLINIC | Age: 89
End: 2024-08-21
Payer: MEDICARE

## 2024-08-21 VITALS
DIASTOLIC BLOOD PRESSURE: 70 MMHG | HEIGHT: 69 IN | OXYGEN SATURATION: 98 % | BODY MASS INDEX: 24.73 KG/M2 | SYSTOLIC BLOOD PRESSURE: 118 MMHG | HEART RATE: 85 BPM | WEIGHT: 167 LBS

## 2024-08-21 DIAGNOSIS — Z95.818 OTHER SPECIFIED POSTPROCEDURAL STATES: ICD-10-CM

## 2024-08-21 DIAGNOSIS — I50.32 CHRONIC DIASTOLIC (CONGESTIVE) HEART FAILURE: ICD-10-CM

## 2024-08-21 DIAGNOSIS — N52.35 ERECTILE DYSFUNCTION FOLLOWING RADIATION THERAPY: ICD-10-CM

## 2024-08-21 DIAGNOSIS — E78.2 MIXED HYPERLIPIDEMIA: ICD-10-CM

## 2024-08-21 DIAGNOSIS — I48.91 UNSPECIFIED ATRIAL FIBRILLATION: ICD-10-CM

## 2024-08-21 DIAGNOSIS — I10 ESSENTIAL (PRIMARY) HYPERTENSION: ICD-10-CM

## 2024-08-21 DIAGNOSIS — R10.13 EPIGASTRIC PAIN: ICD-10-CM

## 2024-08-21 DIAGNOSIS — Z98.890 OTHER SPECIFIED POSTPROCEDURAL STATES: ICD-10-CM

## 2024-08-21 PROCEDURE — 93306 TTE W/DOPPLER COMPLETE: CPT

## 2024-08-21 PROCEDURE — 99215 OFFICE O/P EST HI 40 MIN: CPT

## 2024-08-21 PROCEDURE — G2211 COMPLEX E/M VISIT ADD ON: CPT

## 2024-08-21 NOTE — DISCUSSION/SUMMARY
[FreeTextEntry1] : Patient has medical history detailed above and active medical issues including:  -  Hospital follow-up admission in [facility] May 2024 for leg edema cellulitis, HFpEF., hypotension.  Discharged home on Toprol, furosemide, improved pedal edema, cellulitis following at the wound care clinic.  -  HFpEF, edema resolved after Mitral Clip. Pedal edema on Lasix 40 mg daily, prior 17 pound weight loss May 2021 diuresis with lasix  - Asymptomatic chronic AF on Eliquis, Toprol, off amiodarone.  Patient declines EP evaluation.   - Multiple office evaluations with BENJY prior to MV clip.  Zio patch 1 week heart monitor started today, repeat labs ordered.  - Mitral Clip 9/4/19 Dr Elliott, postop echo LVEF 50-55 trace MR Christopher  Completed aspirin 3 months postop  to allow endothelialization of mitral clip as per  Dr Elliott    - Patient has erectile dysfunction on Viagra 25 mg daily as needed ED  - Hypertension.   Average resting home BPs at guideline goal on Toprol Lasix.  Continue to follow average resting home BPs prior to dose.  - History of asthma  - Borderline hyperlipidemia patient wishes to modify diet, repeat labs ordered  Patient educated on lifestyle and diet modification with low sodium low fat diet and avoidance of excessive alcohol. Patient is aware to call with any symptoms or concerns.  Recommended increased oral hydration with electrolyte suppliment drinks, avoid caffeine and alcohol intake. Limited lifting <50lbs working as a .   Patient will be seen in cardiology followup 3 months.  Current cardiac medications remain unchanged and renewals  are up to date. Repeat labs will be ordered with PMD.  Patient will monitor home heart rate and blood pressures and daily weight.  Followup with Dr Gurpreet Paulino for primary care.   Total time spent 45 minutes, reviewing of test results, chart information, patient discussion, physical exam and completion of chart documentation.

## 2024-08-21 NOTE — PHYSICAL EXAM
[Well Developed] : well developed [Well Nourished] : well nourished [No Acute Distress] : no acute distress [Normal Venous Pressure] : normal venous pressure [No Carotid Bruit] : no carotid bruit [Normal S1, S2] : normal S1, S2 [No Murmur] : no murmur [No Rub] : no rub [No Gallop] : no gallop [Murmur] : murmur [Clear Lung Fields] : clear lung fields [Good Air Entry] : good air entry [No Respiratory Distress] : no respiratory distress  [Soft] : abdomen soft [Non Tender] : non-tender [No Masses/organomegaly] : no masses/organomegaly [Normal Bowel Sounds] : normal bowel sounds [Normal Gait] : normal gait [No Edema] : no edema [No Cyanosis] : no cyanosis [No Clubbing] : no clubbing [No Varicosities] : no varicosities [No Rash] : no rash [No Skin Lesions] : no skin lesions [Moves all extremities] : moves all extremities [No Focal Deficits] : no focal deficits [Normal Speech] : normal speech [Alert and Oriented] : alert and oriented [Normal memory] : normal memory [General Appearance - Well Developed] : well developed [Normal Appearance] : normal appearance [Well Groomed] : well groomed [General Appearance - Well Nourished] : well nourished [No Deformities] : no deformities [General Appearance - In No Acute Distress] : no acute distress [Normal Conjunctiva] : the conjunctiva exhibited no abnormalities [Eyelids - No Xanthelasma] : the eyelids demonstrated no xanthelasmas [Normal Oral Mucosa] : normal oral mucosa [No Oral Pallor] : no oral pallor [No Oral Cyanosis] : no oral cyanosis [Normal Jugular Venous A Waves Present] : normal jugular venous A waves present [Normal Jugular Venous V Waves Present] : normal jugular venous V waves present [No Jugular Venous Garcia A Waves] : no jugular venous garcia A waves [Respiration, Rhythm And Depth] : normal respiratory rhythm and effort [Exaggerated Use Of Accessory Muscles For Inspiration] : no accessory muscle use [Auscultation Breath Sounds / Voice Sounds] : lungs were clear to auscultation bilaterally [Heart Rate And Rhythm] : heart rate and rhythm were normal [Murmurs] : no murmurs present [Abdomen Soft] : soft [Abdomen Tenderness] : non-tender [Abdomen Mass (___ Cm)] : no abdominal mass palpated [Abnormal Walk] : normal gait [Gait - Sufficient For Exercise Testing] : the gait was sufficient for exercise testing [Nail Clubbing] : no clubbing of the fingernails [Cyanosis, Localized] : no localized cyanosis [Petechial Hemorrhages (___cm)] : no petechial hemorrhages [Skin Color & Pigmentation] : normal skin color and pigmentation [] : no rash [No Venous Stasis] : no venous stasis [Skin Lesions] : no skin lesions [No Skin Ulcers] : no skin ulcer [No Xanthoma] : no  xanthoma was observed [Affect] : the affect was normal [Oriented To Time, Place, And Person] : oriented to person, place, and time [Mood] : the mood was normal [No Anxiety] : not feeling anxious [de-identified] : 2/6 MARYLIN MR, trace bipedal edema [FreeTextEntry1] : irreg 3/6 MARYLIN MR

## 2024-08-21 NOTE — DISCUSSION/SUMMARY
[FreeTextEntry1] : Patient has medical history detailed above and active medical issues including:  -  Hospital follow-up admission in [facility] May 2024 for leg edema cellulitis, HFpEF., hypotension.  Discharged home on Toprol, furosemide, improved pedal edema, cellulitis following at the wound care clinic.  -  HFpEF, edema resolved after Mitral Clip. Pedal edema on Lasix 40 mg daily, prior 17 pound weight loss May 2021 diuresis with lasix  - Asymptomatic chronic AF on Eliquis, Toprol, off amiodarone.  Patient declines EP evaluation.   - Multiple office evaluations with BENJY prior to MV clip.  Zio patch 1 week heart monitor started today, repeat labs ordered.  - Mitral Clip 9/4/19 Dr Elliott, postop echo LVEF 50-55 trace MR Christopher  Completed aspirin 3 months postop  to allow endothelialization of mitral clip as per  Dr Elliott    - Patient has erectile dysfunction on Viagra 25 mg daily as needed ED  - Hypertension.   Average resting home BPs at guideline goal on Toprol Lasix.  Continue to follow average resting home BPs prior to dose.  - History of asthma  - Borderline hyperlipidemia patient wishes to modify diet, repeat labs ordered  Patient educated on lifestyle and diet modification with low sodium low fat diet and avoidance of excessive alcohol. Patient is aware to call with any symptoms or concerns.  Recommended increased oral hydration with electrolyte suppliment drinks, avoid caffeine and alcohol intake. Limited lifting <50lbs working as a .   Patient will be seen in cardiology followup 3 months.  Current cardiac medications remain unchanged and renewals  are up to date. Repeat labs will be ordered with PMD.  Patient will monitor home heart rate and blood pressures and daily weight.  Followup with Dr Gurpreet Paulino for primary care.   Total time spent 45 minutes, reviewing of test results, chart information, patient discussion, physical exam and completion of chart documentation.

## 2024-08-21 NOTE — PHYSICAL EXAM
[Well Developed] : well developed [Well Nourished] : well nourished [No Acute Distress] : no acute distress [Normal Venous Pressure] : normal venous pressure [No Carotid Bruit] : no carotid bruit [Normal S1, S2] : normal S1, S2 [No Murmur] : no murmur [No Rub] : no rub [No Gallop] : no gallop [Murmur] : murmur [Clear Lung Fields] : clear lung fields [Good Air Entry] : good air entry [No Respiratory Distress] : no respiratory distress  [Soft] : abdomen soft [Non Tender] : non-tender [No Masses/organomegaly] : no masses/organomegaly [Normal Bowel Sounds] : normal bowel sounds [Normal Gait] : normal gait [No Edema] : no edema [No Cyanosis] : no cyanosis [No Clubbing] : no clubbing [No Varicosities] : no varicosities [No Rash] : no rash [No Skin Lesions] : no skin lesions [Moves all extremities] : moves all extremities [No Focal Deficits] : no focal deficits [Normal Speech] : normal speech [Alert and Oriented] : alert and oriented [Normal memory] : normal memory [General Appearance - Well Developed] : well developed [Normal Appearance] : normal appearance [Well Groomed] : well groomed [General Appearance - Well Nourished] : well nourished [No Deformities] : no deformities [General Appearance - In No Acute Distress] : no acute distress [Normal Conjunctiva] : the conjunctiva exhibited no abnormalities [Eyelids - No Xanthelasma] : the eyelids demonstrated no xanthelasmas [Normal Oral Mucosa] : normal oral mucosa [No Oral Pallor] : no oral pallor [No Oral Cyanosis] : no oral cyanosis [Normal Jugular Venous A Waves Present] : normal jugular venous A waves present [Normal Jugular Venous V Waves Present] : normal jugular venous V waves present [No Jugular Venous Garcia A Waves] : no jugular venous garcia A waves [Respiration, Rhythm And Depth] : normal respiratory rhythm and effort [Exaggerated Use Of Accessory Muscles For Inspiration] : no accessory muscle use [Auscultation Breath Sounds / Voice Sounds] : lungs were clear to auscultation bilaterally [Heart Rate And Rhythm] : heart rate and rhythm were normal [Murmurs] : no murmurs present [Abdomen Soft] : soft [Abdomen Tenderness] : non-tender [Abdomen Mass (___ Cm)] : no abdominal mass palpated [Abnormal Walk] : normal gait [Gait - Sufficient For Exercise Testing] : the gait was sufficient for exercise testing [Nail Clubbing] : no clubbing of the fingernails [Cyanosis, Localized] : no localized cyanosis [Petechial Hemorrhages (___cm)] : no petechial hemorrhages [Skin Color & Pigmentation] : normal skin color and pigmentation [] : no rash [No Venous Stasis] : no venous stasis [Skin Lesions] : no skin lesions [No Skin Ulcers] : no skin ulcer [No Xanthoma] : no  xanthoma was observed [Affect] : the affect was normal [Oriented To Time, Place, And Person] : oriented to person, place, and time [Mood] : the mood was normal [No Anxiety] : not feeling anxious [de-identified] : 2/6 MARYLIN MR, trace bipedal edema [FreeTextEntry1] : irreg 3/6 MARYLIN MR

## 2024-08-21 NOTE — REASON FOR VISIT
[Other: ____] : [unfilled] [Consultation] : a consultation regarding [FreeTextEntry1] : Jeanmarie has a past medical history of hypertension, asthma, severe MR, normal LVEF, recurrent rapid AF on Eliquis and toprol maintaining NSR, Mitral Clip 9/4/19 Dr Elliott, postop echo LVEF 50-55% trace MR, erectile dysfunction on Viagra 25mg, COVID July full recovery  Chronic pedal edema, left leg cellulitis following at wound clinic.  Cardiovascular review of symptoms is negative for exertional chest pain, dyspnea, palpitations, dizziness or syncope.  No PND or orthopnea.  No bleeding or black stool.   Patient is walking 10 minutes without exertional symptoms, weight and edema has been stable.  Patient had 25 pound weight loss over the past year on low calorie diet and reducing alcohol     No need to continue long term aspirin after mitral clip discussed with Dr Elliott.   September 2020 patient had increasing leg edema and 8 pound weight gain patient started Lasix 20 mg daily and within 1 week lost 8 pounds with improvement in leg edema.  Prior to MV clip patient had rapid AF in office 7/26/19 given intravenous Lopressor 5 mg x1 and Lopressor 25 mg orally with improvement in AF rate below 100 bpm, stable blood pressure 125/70  Prior to MV clip patient had office evaluation PCP 7/16/19 recurrent chest pain abdominal pain found to be in new-onset rapid atrial fibrillation.  Patient started on Bystolic and Eliquis.  EKG 7/16/19 rapid AF 136bpm, RBBB, NSST.  EKG 7/17/19 sinus rhythm 66 bpm RBBB, LAFB, bifiaicular block  Echocardiogram August, LVEF 55%, biatrial enlargement, mild-moderate AS, MS and MR, moderate TR, moderate PH  Patient had recurrent atypical chest pain and abdominal pain symptoms July 2019 and  contrast CT abdomen negative for aneurysm.   Echocardiogram Oct 2022 LVEF 50-55%, moderate severe MR, MV clip seen, severe LAE, mild RVE.  Echocardiogram June 2021 LVEF 45-50%, well-seated mitral valve clip with dual orifice, mild MR, LA 4.6 cm, moderate pHTN  Echocardiogram 10/22/2020, LVEF 45%, mild LVH, double orifice mitral valve with well-seated mitral clip, mild AI, mild pulmonary hypertension, LA 5.2 cm  Holter monitor December 2019 AF, flutter  average heart rate 77 bpm, rare to occasional PVCs, couplets, 3 beat NSVT, no symptoms in the diary  EKG sinus bradycardia 53 bpm, RBBB, LAD  Echocardiogram [facility] post Mitral Clip, LVEF 50-55%, trace MR  Cardiac catheterization July 2019, LVEF 55%, nonobstructive coronaries, moderate severe MR  Transesophageal echocardiogram July 2019, 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 Elective DC cardioversion performed patient started on amiodarone 200mg daily.   [FreeTextEntry2] : dependan leg edema, hx PAF on Eliquis, Toprol, off amiodarone, Mitral Clip 9/4/19 Dr Elliott for severe MR, HFpEF

## 2024-08-21 NOTE — REASON FOR VISIT
[Other: ____] : [unfilled] [Consultation] : a consultation regarding [FreeTextEntry1] : Jeanmarie has a past medical history of hypertension, asthma, severe MR, normal LVEF, recurrent rapid AF on Eliquis and toprol maintaining NSR, Mitral Clip 9/4/19 Dr Elliott, postop echo LVEF 50-55% trace MR, erectile dysfunction on Viagra 25mg, COVID July full recovery  Chronic pedal edema, left leg cellulitis following at wound clinic.  Cardiovascular review of symptoms is negative for exertional chest pain, dyspnea, palpitations, dizziness or syncope.  No PND or orthopnea.  No bleeding or black stool.   Patient is walking 10 minutes without exertional symptoms, weight and edema has been stable.  Patient had 25 pound weight loss over the past year on low calorie diet and reducing alcohol     No need to continue long term aspirin after mitral clip discussed with Dr Elliott.   September 2020 patient had increasing leg edema and 8 pound weight gain patient started Lasix 20 mg daily and within 1 week lost 8 pounds with improvement in leg edema.  Prior to MV clip patient had rapid AF in office 7/26/19 given intravenous Lopressor 5 mg x1 and Lopressor 25 mg orally with improvement in AF rate below 100 bpm, stable blood pressure 125/70  Prior to MV clip patient had office evaluation PCP 7/16/19 recurrent chest pain abdominal pain found to be in new-onset rapid atrial fibrillation.  Patient started on Bystolic and Eliquis.  EKG 7/16/19 rapid AF 136bpm, RBBB, NSST.  EKG 7/17/19 sinus rhythm 66 bpm RBBB, LAFB, bifiaicular block  Echocardiogram August, LVEF 55%, biatrial enlargement, mild-moderate AS, MS and MR, moderate TR, moderate PH  Patient had recurrent atypical chest pain and abdominal pain symptoms July 2019 and  contrast CT abdomen negative for aneurysm.   Echocardiogram Oct 2022 LVEF 50-55%, moderate severe MR, MV clip seen, severe LAE, mild RVE.  Echocardiogram June 2021 LVEF 45-50%, well-seated mitral valve clip with dual orifice, mild MR, LA 4.6 cm, moderate pHTN  Echocardiogram 10/22/2020, LVEF 45%, mild LVH, double orifice mitral valve with well-seated mitral clip, mild AI, mild pulmonary hypertension, LA 5.2 cm  Holter monitor December 2019 AF, flutter  average heart rate 77 bpm, rare to occasional PVCs, couplets, 3 beat NSVT, no symptoms in the diary  EKG sinus bradycardia 53 bpm, RBBB, LAD  Echocardiogram [facility] post Mitral Clip, LVEF 50-55%, trace MR  Cardiac catheterization July 2019, LVEF 55%, nonobstructive coronaries, moderate severe MR  Transesophageal echocardiogram July 2019, 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 Elective DC cardioversion performed patient started on amiodarone 200mg daily.   [FreeTextEntry2] : dependan leg edema, hx PAF on Eliquis, Toprol, off amiodarone, Mitral Clip 9/4/19 Dr Elliott for severe MR, HFpEF

## 2024-09-04 ENCOUNTER — TRANSCRIPTION ENCOUNTER (OUTPATIENT)
Age: 89
End: 2024-09-04

## 2024-10-10 NOTE — PROGRESS NOTE ADULT - PROBLEM/PLAN-1
Rx Refill Request Telephone Encounter    Name:  Paty Valentinopriya  :  [id number]  Medication Name:  Oxybutynin XL 5 mg 24 hr tablet   Dose : 5 mg  Route : oral  Frequency : daily   Quantity : 90 tablet   Directions : Take 1 tablet (5 mg) by mouth once daily.  Specific Pharmacy location:  [facility]             
DISPLAY PLAN FREE TEXT

## 2024-10-30 ENCOUNTER — NON-APPOINTMENT (OUTPATIENT)
Age: 89
End: 2024-10-30

## 2024-10-30 ENCOUNTER — APPOINTMENT (OUTPATIENT)
Dept: CARDIOLOGY | Facility: CLINIC | Age: 89
End: 2024-10-30
Payer: MEDICARE

## 2024-10-30 VITALS
SYSTOLIC BLOOD PRESSURE: 120 MMHG | HEIGHT: 69 IN | DIASTOLIC BLOOD PRESSURE: 70 MMHG | WEIGHT: 160 LBS | HEART RATE: 64 BPM | BODY MASS INDEX: 23.7 KG/M2 | OXYGEN SATURATION: 97 %

## 2024-10-30 DIAGNOSIS — I48.91 UNSPECIFIED ATRIAL FIBRILLATION: ICD-10-CM

## 2024-10-30 DIAGNOSIS — Z95.818 OTHER SPECIFIED POSTPROCEDURAL STATES: ICD-10-CM

## 2024-10-30 DIAGNOSIS — N52.35 ERECTILE DYSFUNCTION FOLLOWING RADIATION THERAPY: ICD-10-CM

## 2024-10-30 DIAGNOSIS — I50.33 ACUTE ON CHRONIC DIASTOLIC (CONGESTIVE) HEART FAILURE: ICD-10-CM

## 2024-10-30 DIAGNOSIS — Z98.890 OTHER SPECIFIED POSTPROCEDURAL STATES: ICD-10-CM

## 2024-10-30 DIAGNOSIS — R51.9 HEADACHE, UNSPECIFIED: ICD-10-CM

## 2024-10-30 DIAGNOSIS — I10 ESSENTIAL (PRIMARY) HYPERTENSION: ICD-10-CM

## 2024-10-30 DIAGNOSIS — E78.2 MIXED HYPERLIPIDEMIA: ICD-10-CM

## 2024-10-30 PROCEDURE — G2211 COMPLEX E/M VISIT ADD ON: CPT

## 2024-10-30 PROCEDURE — 99215 OFFICE O/P EST HI 40 MIN: CPT

## 2025-04-09 ENCOUNTER — APPOINTMENT (OUTPATIENT)
Dept: CARDIOLOGY | Facility: CLINIC | Age: 89
End: 2025-04-09
Payer: MEDICARE

## 2025-04-09 VITALS
SYSTOLIC BLOOD PRESSURE: 110 MMHG | WEIGHT: 157 LBS | HEART RATE: 82 BPM | HEIGHT: 69 IN | DIASTOLIC BLOOD PRESSURE: 50 MMHG | OXYGEN SATURATION: 99 % | BODY MASS INDEX: 23.25 KG/M2

## 2025-04-09 DIAGNOSIS — R10.13 EPIGASTRIC PAIN: ICD-10-CM

## 2025-04-09 DIAGNOSIS — I50.32 CHRONIC DIASTOLIC (CONGESTIVE) HEART FAILURE: ICD-10-CM

## 2025-04-09 DIAGNOSIS — I48.91 UNSPECIFIED ATRIAL FIBRILLATION: ICD-10-CM

## 2025-04-09 DIAGNOSIS — I10 ESSENTIAL (PRIMARY) HYPERTENSION: ICD-10-CM

## 2025-04-09 DIAGNOSIS — L03.90 CELLULITIS, UNSPECIFIED: ICD-10-CM

## 2025-04-09 DIAGNOSIS — Z95.818 OTHER SPECIFIED POSTPROCEDURAL STATES: ICD-10-CM

## 2025-04-09 DIAGNOSIS — R51.9 HEADACHE, UNSPECIFIED: ICD-10-CM

## 2025-04-09 DIAGNOSIS — E78.2 MIXED HYPERLIPIDEMIA: ICD-10-CM

## 2025-04-09 DIAGNOSIS — I50.33 ACUTE ON CHRONIC DIASTOLIC (CONGESTIVE) HEART FAILURE: ICD-10-CM

## 2025-04-09 DIAGNOSIS — N52.35 ERECTILE DYSFUNCTION FOLLOWING RADIATION THERAPY: ICD-10-CM

## 2025-04-09 DIAGNOSIS — Z98.890 OTHER SPECIFIED POSTPROCEDURAL STATES: ICD-10-CM

## 2025-04-09 PROCEDURE — 99215 OFFICE O/P EST HI 40 MIN: CPT

## 2025-04-09 PROCEDURE — 93306 TTE W/DOPPLER COMPLETE: CPT

## 2025-04-22 LAB — HBA1C MFR BLD HPLC: 5.8

## 2025-06-06 ENCOUNTER — OFFICE (OUTPATIENT)
Dept: URBAN - METROPOLITAN AREA CLINIC 8 | Facility: CLINIC | Age: 89
Setting detail: OPHTHALMOLOGY
End: 2025-06-06
Payer: MEDICARE

## 2025-06-06 VITALS — HEIGHT: 60 IN

## 2025-06-06 DIAGNOSIS — H10.89: ICD-10-CM

## 2025-06-06 PROCEDURE — 99213 OFFICE O/P EST LOW 20 MIN: CPT | Performed by: OPHTHALMOLOGY

## 2025-06-06 ASSESSMENT — REFRACTION_CURRENTRX
OD_CYLINDER: -0.50
OD_OVR_VA: 20/
OS_AXIS: 059
OD_SPHERE: +2.00
OS_CYLINDER: -1.50
OS_AXIS: 059
OS_SPHERE: +3.50
OD_OVR_VA: 20/
OD_CYLINDER: -0.75
OS_OVR_VA: 20/
OD_AXIS: 104
OS_CYLINDER: -1.25
OD_ADD: +3.00
OS_ADD: +2.25
OS_OVR_VA: 20/
OS_ADD: +3.00
OS_VPRISM_DIRECTION: PROGS
OD_VPRISM_DIRECTION: PROGS
OD_SPHERE: +2.25
OD_AXIS: 105
OS_VPRISM_DIRECTION: PROGS
OD_VPRISM_DIRECTION: PROGS
OD_ADD: +2.25
OS_SPHERE: +3.25

## 2025-06-06 ASSESSMENT — REFRACTION_MANIFEST
OS_VA2: 20/20(J1+)
OD_VA2: 20/20(J1+)
OD_VA2: 20/20(J1+)
OD_CYLINDER: -0.75
OU_VA: 20/25-
OS_VA1: 20/25-1
OD_ADD: +3.00
OD_VA1: 20/25-2
OS_ADD: +3.00
OS_SPHERE: +3.75
OD_SPHERE: +2.25
OD_VA1: 20/25-2
OS_CYLINDER: -1.75
OS_CYLINDER: -1.50
OS_VA1: 20/25-1
OS_ADD: +3.00
OD_AXIS: 105
OS_AXIS: 060
OD_ADD: +3.00
OS_SPHERE: +3.50
OD_SPHERE: +2.25
OU_VA: 20/25-
OD_CYLINDER: -0.75
OD_AXIS: 105
OS_AXIS: 060
OS_VA2: 20/20(J1+)

## 2025-06-06 ASSESSMENT — KERATOMETRY
OD_K1POWER_DIOPTERS: 43.75
OD_K2POWER_DIOPTERS: 44.50
OS_K1POWER_DIOPTERS: 41.50
OD_AXISANGLE_DEGREES: 053
OS_K2POWER_DIOPTERS: 42.00
METHOD_AUTO_MANUAL: AUTO
OS_AXISANGLE_DEGREES: 086

## 2025-06-06 ASSESSMENT — CORNEAL DYSTROPHY - POSTERIOR
OD_POSTERIORDYSTROPHY: 2+ GUTTATA
OS_POSTERIORDYSTROPHY: 2+ GUTTATA

## 2025-06-06 ASSESSMENT — VISUAL ACUITY
OS_BCVA: 20/25-2
OD_BCVA: 20/40-

## 2025-06-06 ASSESSMENT — TEAR BREAK UP TIME (TBUT)
OS_TBUT: 6-8 SEC
OD_TBUT: 6-8 SEC

## 2025-06-06 ASSESSMENT — REFRACTION_AUTOREFRACTION
OD_AXIS: 137
OD_CYLINDER: -0.75
OS_AXIS: 051
OS_SPHERE: +1.50
OS_CYLINDER: -0.75
OD_SPHERE: +0.75

## 2025-06-06 ASSESSMENT — LID POSITION - DERMATOCHALASIS
OD_DERMATOCHALASIS: RUL 2+ 3+
OS_DERMATOCHALASIS: LUL 2+ 3+

## 2025-06-06 ASSESSMENT — DECREASING TEAR LAKE - SEVERITY SCORE
OD_DEC_TEARLAKE: 1+
OS_DEC_TEARLAKE: 1+

## 2025-06-06 ASSESSMENT — CONFRONTATIONAL VISUAL FIELD TEST (CVF)
OS_FINDINGS: FULL
OD_FINDINGS: FULL

## 2025-06-27 ENCOUNTER — OFFICE (OUTPATIENT)
Dept: URBAN - METROPOLITAN AREA CLINIC 8 | Facility: CLINIC | Age: 89
Setting detail: OPHTHALMOLOGY
End: 2025-06-27
Payer: MEDICARE

## 2025-06-27 DIAGNOSIS — H02.824: ICD-10-CM

## 2025-06-27 DIAGNOSIS — H35.3131: ICD-10-CM

## 2025-06-27 DIAGNOSIS — H02.831: ICD-10-CM

## 2025-06-27 DIAGNOSIS — Z96.1: ICD-10-CM

## 2025-06-27 DIAGNOSIS — H16.223: ICD-10-CM

## 2025-06-27 DIAGNOSIS — H02.834: ICD-10-CM

## 2025-06-27 DIAGNOSIS — H25.13: ICD-10-CM

## 2025-06-27 PROBLEM — H10.89 BACTERIAL CONJUNCTIVITIS ; RIGHT EYE: Status: RESOLVED | Noted: 2025-06-06 | Resolved: 2025-06-27

## 2025-06-27 PROCEDURE — 92134 CPTRZ OPH DX IMG PST SGM RTA: CPT | Performed by: OPHTHALMOLOGY

## 2025-06-27 PROCEDURE — 92014 COMPRE OPH EXAM EST PT 1/>: CPT | Performed by: OPHTHALMOLOGY

## 2025-06-27 ASSESSMENT — REFRACTION_MANIFEST
OU_VA: 20/25-
OD_VA1: 20/30
OS_CYLINDER: -1.50
OD_SPHERE: +0.25
OD_CYLINDER: -0.50
OD_VA2: 20/20(J1+)
OD_CYLINDER: -0.75
OD_VA1: 20/25-2
OS_ADD: +3.00
OD_VA2: 20/20(J1+)
OD_AXIS: 105
OS_SPHERE: +3.50
OS_SPHERE: +0.50
OS_VA2: 20/20(J1+)
OS_VA1: 20/25-1
OD_ADD: +3.00
OD_SPHERE: +2.25
OU_VA: 20/25
OS_CYLINDER: -0.50
OS_AXIS: 035
OS_ADD: +3.00
OS_AXIS: 060
OS_VA2: 20/20(J1+)
OS_VA1: 20/25-2
OD_AXIS: 150
OD_ADD: +3.00

## 2025-06-27 ASSESSMENT — REFRACTION_AUTOREFRACTION
OS_AXIS: 040
OD_CYLINDER: -0.50
OS_CYLINDER: -0.50
OD_AXIS: 150
OS_SPHERE: +1.00
OD_SPHERE: +0.25

## 2025-06-27 ASSESSMENT — REFRACTION_CURRENTRX
OD_VPRISM_DIRECTION: PROGS
OD_VPRISM_DIRECTION: PROGS
OS_SPHERE: +3.25
OD_AXIS: 104
OS_VPRISM_DIRECTION: PROGS
OS_AXIS: 059
OD_SPHERE: +2.00
OD_OVR_VA: 20/
OS_CYLINDER: -1.25
OS_SPHERE: +3.50
OS_VPRISM_DIRECTION: PROGS
OS_CYLINDER: -1.50
OS_ADD: +3.00
OS_OVR_VA: 20/
OS_OVR_VA: 20/
OD_ADD: +2.25
OD_CYLINDER: -0.50
OD_OVR_VA: 20/
OS_AXIS: 059
OD_SPHERE: +2.25
OD_CYLINDER: -0.75
OD_AXIS: 105
OS_ADD: +2.25
OD_ADD: +3.00

## 2025-06-27 ASSESSMENT — CORNEAL DYSTROPHY - POSTERIOR
OS_POSTERIORDYSTROPHY: 2+ GUTTATA
OD_POSTERIORDYSTROPHY: 2+ GUTTATA

## 2025-06-27 ASSESSMENT — KERATOMETRY
METHOD_AUTO_MANUAL: AUTO
OS_AXISANGLE_DEGREES: 111
OD_K1POWER_DIOPTERS: 43.75
OS_K1POWER_DIOPTERS: 44.25
OD_K2POWER_DIOPTERS: 44.25
OD_AXISANGLE_DEGREES: 042
OS_K2POWER_DIOPTERS: 44.75

## 2025-06-27 ASSESSMENT — VISUAL ACUITY
OD_BCVA: 20/25-
OS_BCVA: 20/25-

## 2025-06-27 ASSESSMENT — CONFRONTATIONAL VISUAL FIELD TEST (CVF)
OS_FINDINGS: FULL
OD_FINDINGS: FULL

## 2025-06-27 ASSESSMENT — LID POSITION - DERMATOCHALASIS
OS_DERMATOCHALASIS: LUL 2+ 3+
OD_DERMATOCHALASIS: RUL 2+ 3+

## 2025-07-28 ENCOUNTER — APPOINTMENT (OUTPATIENT)
Dept: CARDIOLOGY | Facility: CLINIC | Age: 89
End: 2025-07-28
Payer: MEDICARE

## 2025-07-28 VITALS
DIASTOLIC BLOOD PRESSURE: 60 MMHG | BODY MASS INDEX: 22.51 KG/M2 | SYSTOLIC BLOOD PRESSURE: 108 MMHG | OXYGEN SATURATION: 98 % | WEIGHT: 152 LBS | HEART RATE: 58 BPM | HEIGHT: 69 IN

## 2025-07-28 DIAGNOSIS — I48.91 UNSPECIFIED ATRIAL FIBRILLATION: ICD-10-CM

## 2025-07-28 DIAGNOSIS — I42.8 OTHER CARDIOMYOPATHIES: ICD-10-CM

## 2025-07-28 DIAGNOSIS — N52.35 ERECTILE DYSFUNCTION FOLLOWING RADIATION THERAPY: ICD-10-CM

## 2025-07-28 DIAGNOSIS — E78.2 MIXED HYPERLIPIDEMIA: ICD-10-CM

## 2025-07-28 DIAGNOSIS — I10 ESSENTIAL (PRIMARY) HYPERTENSION: ICD-10-CM

## 2025-07-28 PROCEDURE — 99215 OFFICE O/P EST HI 40 MIN: CPT

## 2025-07-28 RX ORDER — SACUBITRIL AND VALSARTAN 24; 26 MG/1; MG/1
24-26 TABLET, FILM COATED ORAL TWICE DAILY
Qty: 180 | Refills: 1 | Status: ACTIVE | COMMUNITY
Start: 2025-07-28 | End: 1900-01-01

## 2025-09-08 ENCOUNTER — APPOINTMENT (OUTPATIENT)
Dept: CARDIOLOGY | Facility: CLINIC | Age: 89
End: 2025-09-08
Payer: MEDICARE

## 2025-09-15 ENCOUNTER — APPOINTMENT (OUTPATIENT)
Dept: CARDIOLOGY | Facility: CLINIC | Age: 89
End: 2025-09-15
Payer: MEDICARE

## 2025-09-15 VITALS
HEART RATE: 45 BPM | OXYGEN SATURATION: 97 % | SYSTOLIC BLOOD PRESSURE: 98 MMHG | DIASTOLIC BLOOD PRESSURE: 52 MMHG | WEIGHT: 157 LBS | BODY MASS INDEX: 23.25 KG/M2 | HEIGHT: 69 IN

## 2025-09-15 DIAGNOSIS — I48.91 UNSPECIFIED ATRIAL FIBRILLATION: ICD-10-CM

## 2025-09-15 DIAGNOSIS — R51.9 HEADACHE, UNSPECIFIED: ICD-10-CM

## 2025-09-15 DIAGNOSIS — I42.8 OTHER CARDIOMYOPATHIES: ICD-10-CM

## 2025-09-15 DIAGNOSIS — N52.35 ERECTILE DYSFUNCTION FOLLOWING RADIATION THERAPY: ICD-10-CM

## 2025-09-15 DIAGNOSIS — I10 ESSENTIAL (PRIMARY) HYPERTENSION: ICD-10-CM

## 2025-09-15 DIAGNOSIS — E78.2 MIXED HYPERLIPIDEMIA: ICD-10-CM

## 2025-09-15 DIAGNOSIS — I50.33 ACUTE ON CHRONIC DIASTOLIC (CONGESTIVE) HEART FAILURE: ICD-10-CM

## 2025-09-15 DIAGNOSIS — Z95.818 OTHER SPECIFIED POSTPROCEDURAL STATES: ICD-10-CM

## 2025-09-15 DIAGNOSIS — I50.32 CHRONIC DIASTOLIC (CONGESTIVE) HEART FAILURE: ICD-10-CM

## 2025-09-15 DIAGNOSIS — Z98.890 OTHER SPECIFIED POSTPROCEDURAL STATES: ICD-10-CM

## 2025-09-15 PROCEDURE — 99215 OFFICE O/P EST HI 40 MIN: CPT
